# Patient Record
Sex: MALE | Race: WHITE | NOT HISPANIC OR LATINO | Employment: UNEMPLOYED | URBAN - METROPOLITAN AREA
[De-identification: names, ages, dates, MRNs, and addresses within clinical notes are randomized per-mention and may not be internally consistent; named-entity substitution may affect disease eponyms.]

---

## 2018-10-09 ENCOUNTER — TELEPHONE (OUTPATIENT)
Dept: MEDICAL GROUP | Facility: PHYSICIAN GROUP | Age: 53
End: 2018-10-09

## 2018-10-09 NOTE — TELEPHONE ENCOUNTER
Future Appointments       Provider Department Center    10/10/2018 8:35 AM Deandra Trotter M.D. [facility]        NEW PATIENT VISIT PRE-VISIT PLANNING    1.  [employer] Patient is checked in Patient Demographics? YES    2.  Immunizations were updated in [facility] using WebIZ?: Yes       •  Web Iz Recommendations: FLU, MMR , TDAP, VARICELLA (Chicken Pox)  and ZOSTAVAX (Shingles)    3.  Is this appointment scheduled as a Hospital Follow-Up? No    4.  Patient is due for the following Health Maintenance Topics:   Health Maintenance Due   Topic Date Due   • IMM DTaP/Tdap/Td Vaccine (1 - Tdap) 07/02/1984   • COLONOSCOPY  07/02/2015   • IMM ZOSTER VACCINES (1 of 2) 07/02/2015   • IMM INFLUENZA (1) 09/01/2018       5.  Reviewed/Updated the following with patient:   •   Preferred Pharmacy? NO       •   Preferred Lab? NO       •   Preferred Communication? NO       •   Allergies? NO       •   Medications? NO       •   Social History? NO       •   Family History (document living status of immediate family members and if + hx of cancer, diabetes, hypertension, hyperlipidemia, heart attack, stroke) NO    6.  Updated Care Team?       •   DME Company (gait device, O2, CPAP, etc.) NO       •   Other Specialists (eye doctor, derm, GYN, cardiology, endo, etc): NO    7.  MDX printed for Provider? NO    8.  Patient was informed to arrive 15 min prior to their   scheduled appointment and bring in their medication bottles. LVM  Was unable to get in contact with Pt. Prior to visit to complete PVP

## 2018-10-10 ENCOUNTER — OFFICE VISIT (OUTPATIENT)
Dept: MEDICAL GROUP | Facility: PHYSICIAN GROUP | Age: 53
End: 2018-10-10
Payer: COMMERCIAL

## 2018-10-10 ENCOUNTER — HOSPITAL ENCOUNTER (OUTPATIENT)
Dept: LAB | Facility: MEDICAL CENTER | Age: 53
End: 2018-10-10
Attending: FAMILY MEDICINE
Payer: COMMERCIAL

## 2018-10-10 VITALS
RESPIRATION RATE: 18 BRPM | WEIGHT: 170 LBS | SYSTOLIC BLOOD PRESSURE: 110 MMHG | BODY MASS INDEX: 24.34 KG/M2 | HEIGHT: 70 IN | TEMPERATURE: 97.6 F | HEART RATE: 76 BPM | DIASTOLIC BLOOD PRESSURE: 78 MMHG | OXYGEN SATURATION: 97 %

## 2018-10-10 DIAGNOSIS — Z13.6 SCREENING FOR CARDIOVASCULAR CONDITION: ICD-10-CM

## 2018-10-10 DIAGNOSIS — Z13.1 SCREENING FOR DIABETES MELLITUS: ICD-10-CM

## 2018-10-10 DIAGNOSIS — B18.1 CHRONIC VIRAL HEPATITIS B WITHOUT DELTA AGENT AND WITHOUT COMA (HCC): ICD-10-CM

## 2018-10-10 DIAGNOSIS — Z12.5 SCREENING FOR PROSTATE CANCER: ICD-10-CM

## 2018-10-10 DIAGNOSIS — Z11.3 SCREEN FOR STD (SEXUALLY TRANSMITTED DISEASE): ICD-10-CM

## 2018-10-10 DIAGNOSIS — Z12.12 SCREENING FOR COLORECTAL CANCER: ICD-10-CM

## 2018-10-10 DIAGNOSIS — R03.0 ELEVATED BLOOD PRESSURE READING: ICD-10-CM

## 2018-10-10 DIAGNOSIS — Z12.11 SCREENING FOR COLORECTAL CANCER: ICD-10-CM

## 2018-10-10 DIAGNOSIS — R11.0 NAUSEA: ICD-10-CM

## 2018-10-10 LAB
ALBUMIN SERPL BCP-MCNC: 4.5 G/DL (ref 3.2–4.9)
ALBUMIN/GLOB SERPL: 1.6 G/DL
ALP SERPL-CCNC: 71 U/L (ref 30–99)
ALT SERPL-CCNC: 15 U/L (ref 2–50)
ANION GAP SERPL CALC-SCNC: 10 MMOL/L (ref 0–11.9)
AST SERPL-CCNC: 23 U/L (ref 12–45)
BASOPHILS # BLD AUTO: 0.6 % (ref 0–1.8)
BASOPHILS # BLD: 0.03 K/UL (ref 0–0.12)
BILIRUB SERPL-MCNC: 0.9 MG/DL (ref 0.1–1.5)
BUN SERPL-MCNC: 14 MG/DL (ref 8–22)
CALCIUM SERPL-MCNC: 9.2 MG/DL (ref 8.5–10.5)
CHLORIDE SERPL-SCNC: 102 MMOL/L (ref 96–112)
CHOLEST SERPL-MCNC: 149 MG/DL (ref 100–199)
CO2 SERPL-SCNC: 23 MMOL/L (ref 20–33)
CREAT SERPL-MCNC: 0.69 MG/DL (ref 0.5–1.4)
EOSINOPHIL # BLD AUTO: 0.17 K/UL (ref 0–0.51)
EOSINOPHIL NFR BLD: 3.2 % (ref 0–6.9)
ERYTHROCYTE [DISTWIDTH] IN BLOOD BY AUTOMATED COUNT: 44.5 FL (ref 35.9–50)
EST. AVERAGE GLUCOSE BLD GHB EST-MCNC: 120 MG/DL
GLOBULIN SER CALC-MCNC: 2.9 G/DL (ref 1.9–3.5)
GLUCOSE SERPL-MCNC: 112 MG/DL (ref 65–99)
HBA1C MFR BLD: 5.8 % (ref 0–5.6)
HBV CORE AB SERPL QL IA: REACTIVE
HBV SURFACE AB SERPL IA-ACNC: 75.83 MIU/ML (ref 0–10)
HBV SURFACE AG SER QL: NEGATIVE
HCT VFR BLD AUTO: 50.1 % (ref 42–52)
HCV AB SER QL: REACTIVE
HDLC SERPL-MCNC: 58 MG/DL
HGB BLD-MCNC: 17.7 G/DL (ref 14–18)
HIV 1+2 AB+HIV1 P24 AG SERPL QL IA: NON REACTIVE
IMM GRANULOCYTES # BLD AUTO: 0.01 K/UL (ref 0–0.11)
IMM GRANULOCYTES NFR BLD AUTO: 0.2 % (ref 0–0.9)
LDLC SERPL CALC-MCNC: 79 MG/DL
LYMPHOCYTES # BLD AUTO: 1.3 K/UL (ref 1–4.8)
LYMPHOCYTES NFR BLD: 24.6 % (ref 22–41)
MCH RBC QN AUTO: 32.4 PG (ref 27–33)
MCHC RBC AUTO-ENTMCNC: 35.3 G/DL (ref 33.7–35.3)
MCV RBC AUTO: 91.8 FL (ref 81.4–97.8)
MONOCYTES # BLD AUTO: 0.62 K/UL (ref 0–0.85)
MONOCYTES NFR BLD AUTO: 11.7 % (ref 0–13.4)
NEUTROPHILS # BLD AUTO: 3.16 K/UL (ref 1.82–7.42)
NEUTROPHILS NFR BLD: 59.7 % (ref 44–72)
NRBC # BLD AUTO: 0 K/UL
NRBC BLD-RTO: 0 /100 WBC
PLATELET # BLD AUTO: 173 K/UL (ref 164–446)
PMV BLD AUTO: 10.7 FL (ref 9–12.9)
POTASSIUM SERPL-SCNC: 4 MMOL/L (ref 3.6–5.5)
PROT SERPL-MCNC: 7.4 G/DL (ref 6–8.2)
PSA SERPL-MCNC: 0.14 NG/ML (ref 0–4)
RBC # BLD AUTO: 5.46 M/UL (ref 4.7–6.1)
SODIUM SERPL-SCNC: 135 MMOL/L (ref 135–145)
TRIGL SERPL-MCNC: 61 MG/DL (ref 0–149)
WBC # BLD AUTO: 5.3 K/UL (ref 4.8–10.8)

## 2018-10-10 PROCEDURE — 86706 HEP B SURFACE ANTIBODY: CPT

## 2018-10-10 PROCEDURE — 83036 HEMOGLOBIN GLYCOSYLATED A1C: CPT

## 2018-10-10 PROCEDURE — 99204 OFFICE O/P NEW MOD 45 MIN: CPT | Performed by: FAMILY MEDICINE

## 2018-10-10 PROCEDURE — 84153 ASSAY OF PSA TOTAL: CPT

## 2018-10-10 PROCEDURE — 87522 HEPATITIS C REVRS TRNSCRPJ: CPT

## 2018-10-10 PROCEDURE — 80053 COMPREHEN METABOLIC PANEL: CPT

## 2018-10-10 PROCEDURE — 85025 COMPLETE CBC W/AUTO DIFF WBC: CPT

## 2018-10-10 PROCEDURE — 86704 HEP B CORE ANTIBODY TOTAL: CPT

## 2018-10-10 PROCEDURE — 87340 HEPATITIS B SURFACE AG IA: CPT

## 2018-10-10 PROCEDURE — 36415 COLL VENOUS BLD VENIPUNCTURE: CPT

## 2018-10-10 PROCEDURE — 86803 HEPATITIS C AB TEST: CPT

## 2018-10-10 PROCEDURE — 87389 HIV-1 AG W/HIV-1&-2 AB AG IA: CPT

## 2018-10-10 PROCEDURE — 80061 LIPID PANEL: CPT

## 2018-10-10 RX ORDER — OMEPRAZOLE 20 MG/1
CAPSULE, DELAYED RELEASE ORAL
Qty: 30 CAP | Refills: 0 | Status: SHIPPED | OUTPATIENT
Start: 2018-10-10 | End: 2018-11-30

## 2018-10-10 ASSESSMENT — PATIENT HEALTH QUESTIONNAIRE - PHQ9: CLINICAL INTERPRETATION OF PHQ2 SCORE: 0

## 2018-10-10 NOTE — PROGRESS NOTES
"cc: Hepatitis B      Subjective:     Misael Echevarria is a 53 y.o. male presenting for the following:     Hepatitis B-patient was a previous IV drug user and stopped using in 2003.  He was then seen by gastroenterology and treated for hepatitis B 5 years ago.  He was told that his numbers were low enough that he was considered cured and they told him to stop the Baraclude and to follow-up yearly for blood work.  But he then lost his insurance and has not been back since.  He would like a referral back to them now.  But he has been feeling well.  He has not had problems with yellow skin, abdominal pain, poor appetite, changes in weight.    Recent gastroenteritis-but 4 days ago patient with a day of vomiting and diarrhea.  He is not sure if he ate something or if it was a virus.  He did not have any blood in the vomit or stool.  The diarrhea and abdominal cramping has completely improved.  But he has noticed that his stomach is still not quite right.  He is having some nausea (no vomiting).  He is only able to tolerate small amounts of bland foods.  He is not having any fevers or chills or abdominal pain.    He also is using a nicotine patch and doing his best to quit smoking.  He now feels ready to move down to the lower dose patch.  He is very motivated for this.    Review of systems:  All others reviewed and are negative.       Current Outpatient Prescriptions:   •  omeprazole (PRILOSEC) 20 MG delayed-release capsule, One tablet daily for 2 weeks, then every other day for 1-3 weeks, then stop., Disp: 30 Cap, Rfl: 0    Allergies, past medical history, past surgical history, family history, social history reviewed and updated    Objective:     Vitals: /78 (BP Location: Right arm, Patient Position: Sitting, BP Cuff Size: Adult)   Pulse 76   Temp 36.4 °C (97.6 °F) (Temporal)   Resp 18   Ht 1.778 m (5' 10\")   Wt 77.1 kg (170 lb)   SpO2 97%   BMI 24.39 kg/m²   General: Alert, pleasant, NAD  HEENT: Normocephalic.   " EOMI, no icterus or pallor.  Conjunctivae and lids normal. External ears normal. Oropharynx non-erythematous, mucous membranes moist.    Neck supple.  No thyromegaly or masses palpated. No cervical or supraclavicular lymphadenopathy.  Heart: Regular rate and rhythm.  S1 and S2 normal.  No murmurs appreciated.  Respiratory: Normal respiratory effort.  Clear to auscultation bilaterally.  Abdomen: Non-distended, soft  Skin: Warm, dry, no rashes.  Musculoskeletal: Gait is normal.  Moves all extremities well.  Extremities: No leg edema.    Neurological: No tremors, sensation grossly intact,  tone/strength normal, gait is normal, CN2-12 grossly intact  Psych:  Affect is normal, judgement is good, memory is intact, grooming is appropriate.    Assessment/Plan:     Misael was seen today for annual exam.    Diagnoses and all orders for this visit:    Chronic viral hepatitis B without delta agent and without coma (HCC): Previously treated 5 years ago and then lost to follow-up due to insurance issues.  Will refer back to GI consultants and get screening blood work.  Patient instructed to get copies of blood work prior to his appointment with gastroenterology.  -     COMP METABOLIC PANEL; Future  -     HEP B SURFACE AB; Future  -     HEP B SURFACE ANTIGEN; Future  -     HEP B CORE AB TOTAL; Future  -     HEP C VIRUS ANTIBODY; Future  -     HIV AG/AB COMBO ASSAY SCREENING; Future  -     CBC WITH DIFFERENTIAL; Future  -     REFERRAL TO GASTROENTEROLOGY    Screening for colorectal cancer patient would also like a colonoscopy to screen for colon cancer when he sees gastroenterology.  He does not want to do the annual fit testing.  -     REFERRAL TO GASTROENTEROLOGY    Screening for prostate cancer-patient does have a hAlf brother with prostate cancer. Patient counseled on the risks and benefits of PSA testing.  Patient verbalizes understanding of risk of false positive, false negative, and need for referral and possibly prostate  biopsy if positive.  Patient without lower urinary tract symptoms.    -     PROSTATE SPECIFIC AG SCREENING; Future    Screening for diabetes mellitus  -     HEMOGLOBIN A1C; Future    Screening for cardiovascular condition  -     LIPID PROFILE; Future    Screen for STD (sexually transmitted disease)  -     HEP B SURFACE AB; Future  -     HEP B SURFACE ANTIGEN; Future  -     HEP B CORE AB TOTAL; Future  -     HEP C VIRUS ANTIBODY; Future  -     HIV AG/AB COMBO ASSAY SCREENING; Future    Nausea-patient with recent GI illness and with some continued nausea.  Likely some gastritis.  Will treat with a few weeks of tapering Prilosec.  Return to clinic if not improved.  -     omeprazole (PRILOSEC) 20 MG delayed-release capsule; One tablet daily for 2 weeks, then every other day for 1-3 weeks, then stop.    Elevated blood pressure reading-patient had an elevated systolic blood pressure to 180 in the past at work.  Blood pressure good today and on our last check.  Will monitor blood pressure.      Return in about 3 months (around 1/10/2019).

## 2018-10-13 LAB
HCV QNT BY NAAT INTERP L112599: NOT DETECTED
HCV RNA SERPL NAA+PROBE-ACNC: NOT DETECTED IU/ML
HCV RNA SERPL NAA+PROBE-LOG IU: NOT DETECTED LOG IU/ML

## 2018-11-01 ENCOUNTER — HOSPITAL ENCOUNTER (OUTPATIENT)
Dept: RADIOLOGY | Facility: MEDICAL CENTER | Age: 53
End: 2018-11-01
Attending: PHYSICIAN ASSISTANT
Payer: COMMERCIAL

## 2018-11-09 ENCOUNTER — HOSPITAL ENCOUNTER (OUTPATIENT)
Dept: RADIOLOGY | Facility: MEDICAL CENTER | Age: 53
End: 2018-11-09
Attending: PHYSICIAN ASSISTANT
Payer: COMMERCIAL

## 2018-11-09 DIAGNOSIS — B18.1 HEPATITIS B CARRIER (HCC): ICD-10-CM

## 2018-11-09 DIAGNOSIS — Z12.11 SPECIAL SCREENING FOR MALIGNANT NEOPLASMS, COLON: ICD-10-CM

## 2018-11-09 PROCEDURE — 76705 ECHO EXAM OF ABDOMEN: CPT

## 2019-02-07 ENCOUNTER — OFFICE VISIT (OUTPATIENT)
Dept: MEDICAL GROUP | Facility: PHYSICIAN GROUP | Age: 54
End: 2019-02-07
Payer: COMMERCIAL

## 2019-02-07 VITALS
HEART RATE: 68 BPM | OXYGEN SATURATION: 94 % | HEIGHT: 70 IN | DIASTOLIC BLOOD PRESSURE: 74 MMHG | BODY MASS INDEX: 24.34 KG/M2 | WEIGHT: 170 LBS | RESPIRATION RATE: 18 BRPM | SYSTOLIC BLOOD PRESSURE: 118 MMHG | TEMPERATURE: 97 F

## 2019-02-07 DIAGNOSIS — Z71.6 ENCOUNTER FOR SMOKING CESSATION COUNSELING: ICD-10-CM

## 2019-02-07 PROCEDURE — 99214 OFFICE O/P EST MOD 30 MIN: CPT | Performed by: FAMILY MEDICINE

## 2019-02-07 RX ORDER — VARENICLINE TARTRATE 1 MG/1
1 TABLET, FILM COATED ORAL 2 TIMES DAILY
Qty: 60 TAB | Refills: 3 | Status: SHIPPED | OUTPATIENT
Start: 2019-02-07 | End: 2019-07-11

## 2019-02-07 ASSESSMENT — PATIENT HEALTH QUESTIONNAIRE - PHQ9: CLINICAL INTERPRETATION OF PHQ2 SCORE: 0

## 2019-02-07 NOTE — PATIENT INSTRUCTIONS
Chantix/varenicline:   Initial:  Days 1 to 3: 0.5 mg once daily  Days 4 to 7: 0.5 mg twice daily  Maintenance (? Day 8): 1 mg twice daily for 11 weeks; may consider a temporary or permanent dose reduction if usual dose is not tolerated.    Note: Start 1 week before target quit date.  If able to successfully quit smoking at the end of the 12 weeks, may continue for another 12 weeks to help maintain success.

## 2019-02-07 NOTE — PROGRESS NOTES
"cc: smoking cessation counseling      Subjective:     Misael Echevarria is a 53 y.o. male presenting for the following:     [place] has been smoking since he was a young man.  Averaged 1 PPD for about 30 years. He has been cutting back with nicotine patches but is unable to get lower than 8 cigs per day. He has never tried Chantix but he is very interested in this medication.     Never with h/o seizure. No renal dysfunction. Denies problems with mood.     Review of systems:  All others reviewed and are negative.       Current Outpatient Prescriptions:   •  varenicline (CHANTIX STARTING MONTH SUKH) 0.5 MG X 11 & 1 MG X 42 tablet, Days 1-3: 0.5 mg once daily. Days 4-7: 0.5 mg twice daily. Then 1 mg twice daily., Disp: 56 Tab, Rfl: 0  •  varenicline (CHANTIX CONTINUING MONTH SUKH) 1 MG tablet, Take 1 Tab by mouth 2 times a day., Disp: 60 Tab, Rfl: 3    Allergies, past medical history, past surgical history, family history, social history reviewed and updated    Objective:     Vitals: /74 (BP Location: Left arm, Patient Position: Sitting, BP Cuff Size: Adult)   Pulse 68   Temp 36.1 °C (97 °F) (Temporal)   Resp 18   Ht 1.778 m (5' 10\")   Wt 77.1 kg (170 lb)   SpO2 94%   BMI 24.39 kg/m²   General: Alert, pleasant, NAD  Heart: Regular rate and rhythm.  S1 and S2 normal.  No murmurs appreciated.  Respiratory: Normal respiratory effort.  Clear to auscultation bilaterally.  Psych:  Affect is normal, judgement is good, memory is intact, grooming is appropriate.    Assessment/Plan:     Misael was seen today for medication refill.    Diagnoses and all orders for this visit:    Encounter for smoking cessation counseling:patient very interested in Chantix, as he has failed nicotine replacement with patches. No contraindications. Common side effects discussed.   -Did discuss lung cancer screening program when he reaches age 55, as he does have 30 pack year history.   -     varenicline (CHANTIX STARTING MONTH SUKH) 0.5 MG X 11 " & 1 MG X 42 tablet; Days 1-3: 0.5 mg once daily. Days 4-7: 0.5 mg twice daily. Then 1 mg twice daily.  -     varenicline (CHANTIX CONTINUING MONTH SUKH) 1 MG tablet; Take 1 Tab by mouth 2 times a day.    Return if symptoms worsen or fail to improve.

## 2019-05-17 ENCOUNTER — HOSPITAL ENCOUNTER (OUTPATIENT)
Dept: LAB | Facility: MEDICAL CENTER | Age: 54
End: 2019-05-17
Attending: PHYSICIAN ASSISTANT
Payer: COMMERCIAL

## 2019-05-17 LAB
ALBUMIN SERPL BCP-MCNC: 4.4 G/DL (ref 3.2–4.9)
ALP SERPL-CCNC: 64 U/L (ref 30–99)
ALT SERPL-CCNC: 17 U/L (ref 2–50)
AST SERPL-CCNC: 19 U/L (ref 12–45)
BILIRUB CONJ SERPL-MCNC: 0.2 MG/DL (ref 0.1–0.5)
BILIRUB INDIRECT SERPL-MCNC: 0.5 MG/DL (ref 0–1)
BILIRUB SERPL-MCNC: 0.7 MG/DL (ref 0.1–1.5)
PROT SERPL-MCNC: 7.3 G/DL (ref 6–8.2)

## 2019-05-17 PROCEDURE — 87517 HEPATITIS B DNA QUANT: CPT

## 2019-05-17 PROCEDURE — 36415 COLL VENOUS BLD VENIPUNCTURE: CPT

## 2019-05-17 PROCEDURE — 80076 HEPATIC FUNCTION PANEL: CPT

## 2019-05-19 LAB
HBV DNA SERPL NAA+PROBE-ACNC: ABNORMAL IU/ML
HBV DNA SERPL NAA+PROBE-LOG IU: <1 LOG IU/ML
HBV DNA SERPL QL NAA+PROBE: DETECTED

## 2019-05-31 ENCOUNTER — HOSPITAL ENCOUNTER (OUTPATIENT)
Dept: RADIOLOGY | Facility: MEDICAL CENTER | Age: 54
End: 2019-05-31
Attending: PHYSICIAN ASSISTANT
Payer: COMMERCIAL

## 2019-05-31 DIAGNOSIS — B18.1 HEPATITIS B CARRIER (HCC): ICD-10-CM

## 2019-05-31 PROCEDURE — 76705 ECHO EXAM OF ABDOMEN: CPT

## 2019-07-11 ENCOUNTER — OFFICE VISIT (OUTPATIENT)
Dept: MEDICAL GROUP | Facility: PHYSICIAN GROUP | Age: 54
End: 2019-07-11
Payer: COMMERCIAL

## 2019-07-11 ENCOUNTER — TELEPHONE (OUTPATIENT)
Dept: MEDICAL GROUP | Facility: PHYSICIAN GROUP | Age: 54
End: 2019-07-11

## 2019-07-11 VITALS
OXYGEN SATURATION: 96 % | DIASTOLIC BLOOD PRESSURE: 70 MMHG | BODY MASS INDEX: 23.48 KG/M2 | HEIGHT: 70 IN | HEART RATE: 60 BPM | SYSTOLIC BLOOD PRESSURE: 122 MMHG | WEIGHT: 164 LBS | TEMPERATURE: 98.7 F

## 2019-07-11 DIAGNOSIS — F17.210 CIGARETTE NICOTINE DEPENDENCE WITHOUT COMPLICATION: ICD-10-CM

## 2019-07-11 DIAGNOSIS — N52.9 ERECTILE DYSFUNCTION, UNSPECIFIED ERECTILE DYSFUNCTION TYPE: ICD-10-CM

## 2019-07-11 PROCEDURE — 99213 OFFICE O/P EST LOW 20 MIN: CPT | Mod: 25 | Performed by: PHYSICIAN ASSISTANT

## 2019-07-11 PROCEDURE — 99406 BEHAV CHNG SMOKING 3-10 MIN: CPT | Performed by: PHYSICIAN ASSISTANT

## 2019-07-11 RX ORDER — SILDENAFIL 50 MG/1
50 TABLET, FILM COATED ORAL PRN
Qty: 10 TAB | Refills: 3 | Status: SHIPPED | OUTPATIENT
Start: 2019-07-11 | End: 2021-02-10

## 2019-07-11 RX ORDER — NICOTINE 21 MG/24HR
1 PATCH, TRANSDERMAL 24 HOURS TRANSDERMAL EVERY 24 HOURS
Qty: 30 PATCH | Refills: 1 | Status: SHIPPED | OUTPATIENT
Start: 2019-07-11 | End: 2019-11-19

## 2019-07-12 NOTE — PROGRESS NOTES
"Subjective:   Misael Echevarria is a 54 y.o. male here today for smoking cessation. Is an established patient of Deandra Trotter MD.    HPI:    Misael presents to the office today to discuss smoking cessation. He has 30 pack year history. He was seen by PCP back in February who prescribed Chantix. He states that he had difficulty taking this correctly due to needing to take with food. Also didn't like how it made him feel. Is requesting to go back on nicotine patches which he was using prior to Chantix. States did not experience any side effects and was able to get down to about 6 cigarettes per day.    He also has a complaint regarding difficulty obtaining/maintaining erection.  He states that he previously discussed this issue with his PCP who thought that his problem was likely psychogenic in nature.  He does feel that this could be the case.  He recently has started becoming sexually active with women again after not being sexually active for many years.  He does feel that he has some performance anxiety and is very embarrassed by not being able to perform sexually. Cannot maintain erection. He knows he is still capable of strong erection because he has one every morning. He recently purchased what he states is Viagra after seeing a commercial on the television.  He states it looked like Viagra but did not work as well as previous Viagra that he is tried so is not sure that it was really what it said it was.  He is requesting new prescription for Viagra, as he feels that it will help him to regain his confidence.      Current medicines (including changes today)  No current outpatient prescriptions on file.     No current facility-administered medications for this visit.      He  has a past medical history of Drug abuse (HCC).    ROS  As per HPI.       Objective:     /70 (BP Location: Left arm, Patient Position: Sitting, BP Cuff Size: Adult)   Pulse 60   Temp 37.1 °C (98.7 °F)   Ht 1.778 m (5' 10\")   Wt 74.4 kg " (164 lb)   SpO2 96%  Body mass index is 23.53 kg/m².     Physical Exam:  Constitutional: Alert, well-appearing, very pleasant, no distress. Smells of tobacco smoke.  Skin: No rashes in visible areas.  Eye: Conjunctiva clear, lids normal.  ENMT: Lips without lesions, moist mucus membranes.      Assessment and Plan:   The following treatment plan was discussed    1. Cigarette nicotine dependence without complication  Established problem, uncontrolled.  Does not want to continue Chantix.  I have prescribed nicotine patches which he previously did reasonably well on. Will start at 21 mg/day.  Advised to contact the clinic when he has about a week left of the patches and the next step down, 14 mg can be prescribed. He does feel that this time, he will be able to fully quit.  Strongly encouraged him to consider support group/quit line. Total counseling time: 5 minutes  - nicotine (NICODERM) 21 MG/24HR PATCH 24 HR; Apply 1 Patch to skin as directed every 24 hours.  Dispense: 30 Patch; Refill: 1    2. Erectile dysfunction, unspecified erectile dysfunction type  New problem to me, uncontrolled.  Agree that his ED seems psychogenic in nature.  A trial of Viagra may be helpful to help with performance anxiety.  I have prescribed.  I highly doubt that the medication he received from the television was true Viagra.  - sildenafil citrate (VIAGRA) 50 MG tablet; Take 1 Tab by mouth as needed for Erectile Dysfunction.  Dispense: 10 Tab; Refill: 3      Followup: Return if symptoms worsen or fail to improve.    Hermila Hope P.A.-C.

## 2019-07-12 NOTE — PATIENT INSTRUCTIONS
"Smoking Cessation, Tips for Success  If you are ready to quit smoking, congratulations! You have chosen to help yourself be healthier. Cigarettes bring nicotine, tar, carbon monoxide, and other irritants into your body. Your lungs, heart, and blood vessels will be able to work better without these poisons. There are many different ways to quit smoking. Nicotine gum, nicotine patches, a nicotine inhaler, or nicotine nasal spray can help with physical craving. Hypnosis, support groups, and medicines help break the habit of smoking.  WHAT THINGS CAN I DO TO MAKE QUITTING EASIER?   Here are some tips to help you quit for good:  · Pick a date when you will quit smoking completely. Tell all of your friends and family about your plan to quit on that date.  · Do not try to slowly cut down on the number of cigarettes you are smoking. Pick a quit date and quit smoking completely starting on that day.  · Throw away all cigarettes.    · Clean and remove all ashtrays from your home, work, and car.  · On a card, write down your reasons for quitting. Carry the card with you and read it when you get the urge to smoke.  · Cleanse your body of nicotine. Drink enough water and fluids to keep your urine clear or pale yellow. Do this after quitting to flush the nicotine from your body.  · Learn to predict your moods. Do not let a bad situation be your excuse to have a cigarette. Some situations in your life might tempt you into wanting a cigarette.  · Never have \"just one\" cigarette. It leads to wanting another and another. Remind yourself of your decision to quit.  · Change habits associated with smoking. If you smoked while driving or when feeling stressed, try other activities to replace smoking. Stand up when drinking your coffee. Brush your teeth after eating. Sit in a different chair when you read the paper. Avoid alcohol while trying to quit, and try to drink fewer caffeinated beverages. Alcohol and caffeine may urge you to " "smoke.  · Avoid foods and drinks that can trigger a desire to smoke, such as sugary or spicy foods and alcohol.  · Ask people who smoke not to smoke around you.  · Have something planned to do right after eating or having a cup of coffee. For example, plan to take a walk or exercise.  · Try a relaxation exercise to calm you down and decrease your stress. Remember, you may be tense and nervous for the first 2 weeks after you quit, but this will pass.  · Find new activities to keep your hands busy. Play with a pen, coin, or rubber band. Doodle or draw things on paper.  · Brush your teeth right after eating. This will help cut down on the craving for the taste of tobacco after meals. You can also try mouthwash.    · Use oral substitutes in place of cigarettes. Try using lemon drops, carrots, cinnamon sticks, or chewing gum. Keep them handy so they are available when you have the urge to smoke.  · When you have the urge to smoke, try deep breathing.  · Designate your home as a nonsmoking area.  · If you are a heavy smoker, ask your health care provider about a prescription for nicotine chewing gum. It can ease your withdrawal from nicotine.  · Reward yourself. Set aside the cigarette money you save and buy yourself something nice.  · Look for support from others. Join a support group or smoking cessation program. Ask someone at home or at work to help you with your plan to quit smoking.  · Always ask yourself, \"Do I need this cigarette or is this just a reflex?\" Tell yourself, \"Today, I choose not to smoke,\" or \"I do not want to smoke.\" You are reminding yourself of your decision to quit.  · Do not replace cigarette smoking with electronic cigarettes (commonly called e-cigarettes). The safety of e-cigarettes is unknown, and some may contain harmful chemicals.  · If you relapse, do not give up! Plan ahead and think about what you will do the next time you get the urge to smoke.  HOW WILL I FEEL WHEN I QUIT SMOKING?  You " may have symptoms of withdrawal because your body is used to nicotine (the addictive substance in cigarettes). You may crave cigarettes, be irritable, feel very hungry, cough often, get headaches, or have difficulty concentrating. The withdrawal symptoms are only temporary. They are strongest when you first quit but will go away within 10-14 days. When withdrawal symptoms occur, stay in control. Think about your reasons for quitting. Remind yourself that these are signs that your body is healing and getting used to being without cigarettes. Remember that withdrawal symptoms are easier to treat than the major diseases that smoking can cause.   Even after the withdrawal is over, expect periodic urges to smoke. However, these cravings are generally short lived and will go away whether you smoke or not. Do not smoke!  WHAT RESOURCES ARE AVAILABLE TO HELP ME QUIT SMOKING?  Your health care provider can direct you to community resources or hospitals for support, which may include:  · Group support.  · Education.  · Hypnosis.  · Therapy.     This information is not intended to replace advice given to you by your health care provider. Make sure you discuss any questions you have with your health care provider.     Document Released: 09/15/2005 Document Revised: 01/08/2016 Document Reviewed: 06/05/2014  [employer] Interactive Patient Education ©2016 [employer]

## 2019-07-12 NOTE — TELEPHONE ENCOUNTER
[facility] pharmacy called requesting clarification on sildenafil. They are needing max dose per day. Please advise.

## 2019-09-13 ENCOUNTER — HOSPITAL ENCOUNTER (OUTPATIENT)
Dept: LAB | Facility: MEDICAL CENTER | Age: 54
End: 2019-09-13
Attending: PHYSICIAN ASSISTANT
Payer: COMMERCIAL

## 2019-09-13 LAB
ALBUMIN SERPL BCP-MCNC: 4.5 G/DL (ref 3.2–4.9)
ALP SERPL-CCNC: 66 U/L (ref 30–99)
ALT SERPL-CCNC: 14 U/L (ref 2–50)
AST SERPL-CCNC: 18 U/L (ref 12–45)
BILIRUB CONJ SERPL-MCNC: 0.1 MG/DL (ref 0.1–0.5)
BILIRUB INDIRECT SERPL-MCNC: 0.5 MG/DL (ref 0–1)
BILIRUB SERPL-MCNC: 0.6 MG/DL (ref 0.1–1.5)
HBV SURFACE AG SER QL: NEGATIVE
PROT SERPL-MCNC: 7.2 G/DL (ref 6–8.2)

## 2019-09-13 PROCEDURE — 80076 HEPATIC FUNCTION PANEL: CPT

## 2019-09-13 PROCEDURE — 87517 HEPATITIS B DNA QUANT: CPT

## 2019-09-13 PROCEDURE — 36415 COLL VENOUS BLD VENIPUNCTURE: CPT

## 2019-09-13 PROCEDURE — 87340 HEPATITIS B SURFACE AG IA: CPT

## 2019-09-28 DIAGNOSIS — F17.210 CIGARETTE NICOTINE DEPENDENCE WITHOUT COMPLICATION: ICD-10-CM

## 2019-09-30 NOTE — TELEPHONE ENCOUNTER
Was the patient seen in the last year in this department? Yes    Does patient have an active prescription for medications requested? No     Received Request Via: Pharmacy      Pt met protocol?: Yes   Pt last ov 7/19

## 2019-09-30 NOTE — TELEPHONE ENCOUNTER
Dr Trotter- Pt has now taken 21mg for the last 2 month, not sure if you would like to decrease to 14mg or discuss further with pt.

## 2019-10-01 DIAGNOSIS — F17.210 CIGARETTE NICOTINE DEPENDENCE WITHOUT COMPLICATION: ICD-10-CM

## 2019-10-01 RX ORDER — NICOTINE 21 MG/24HR
1 PATCH, TRANSDERMAL 24 HOURS TRANSDERMAL EVERY 24 HOURS
Qty: 30 PATCH | Refills: 0 | OUTPATIENT
Start: 2019-10-01

## 2019-10-01 RX ORDER — NICOTINE 21 MG/24HR
1 PATCH, TRANSDERMAL 24 HOURS TRANSDERMAL EVERY 24 HOURS
Qty: 30 PATCH | Refills: 0 | Status: SHIPPED | OUTPATIENT
Start: 2019-10-01 | End: 2019-11-19 | Stop reason: SDUPTHER

## 2019-10-01 NOTE — TELEPHONE ENCOUNTER
Was the patient seen in the last year in this department? Yes    Does patient have an active prescription for medications requested? No     Received Request Via: Pharmacy      Pt met protocol?: Yes, ov 7/19

## 2019-10-01 NOTE — TELEPHONE ENCOUNTER
Dr. Trotter, Do you want patient to continue on this dose of Nicotine? Please refill as you see fit.

## 2019-11-18 DIAGNOSIS — F17.210 CIGARETTE NICOTINE DEPENDENCE WITHOUT COMPLICATION: ICD-10-CM

## 2019-11-19 RX ORDER — NICOTINE 21 MG/24HR
1 PATCH, TRANSDERMAL 24 HOURS TRANSDERMAL EVERY 24 HOURS
Qty: 30 PATCH | Refills: 0 | Status: SHIPPED | OUTPATIENT
Start: 2019-11-19 | End: 2021-02-10

## 2019-11-19 RX ORDER — NICOTINE 21 MG/24HR
1 PATCH, TRANSDERMAL 24 HOURS TRANSDERMAL EVERY 24 HOURS
Qty: 30 PATCH | Refills: 1 | OUTPATIENT
Start: 2019-11-19

## 2019-11-20 NOTE — TELEPHONE ENCOUNTER
Requesting refills of nicotine patches.  We will send 1 refill of 14 mg patch and a new prescription for the lower dose 7 mg patch.

## 2020-01-21 ENCOUNTER — OFFICE VISIT (OUTPATIENT)
Dept: URGENT CARE | Facility: PHYSICIAN GROUP | Age: 55
End: 2020-01-21
Payer: COMMERCIAL

## 2020-01-21 VITALS
HEART RATE: 84 BPM | RESPIRATION RATE: 16 BRPM | OXYGEN SATURATION: 96 % | BODY MASS INDEX: 22.19 KG/M2 | TEMPERATURE: 98.6 F | SYSTOLIC BLOOD PRESSURE: 118 MMHG | DIASTOLIC BLOOD PRESSURE: 76 MMHG | HEIGHT: 70 IN | WEIGHT: 155 LBS

## 2020-01-21 DIAGNOSIS — K52.9 GASTROENTERITIS: Primary | ICD-10-CM

## 2020-01-21 DIAGNOSIS — R11.2 NAUSEA AND VOMITING, INTRACTABILITY OF VOMITING NOT SPECIFIED, UNSPECIFIED VOMITING TYPE: ICD-10-CM

## 2020-01-21 DIAGNOSIS — R19.7 DIARRHEA, UNSPECIFIED TYPE: ICD-10-CM

## 2020-01-21 PROCEDURE — 99214 OFFICE O/P EST MOD 30 MIN: CPT | Performed by: NURSE PRACTITIONER

## 2020-01-21 RX ORDER — ONDANSETRON 4 MG/1
4 TABLET, ORALLY DISINTEGRATING ORAL EVERY 8 HOURS PRN
Qty: 10 TAB | Refills: 0 | Status: SHIPPED | OUTPATIENT
Start: 2020-01-21 | End: 2021-02-10

## 2020-01-21 RX ORDER — DIPHENOXYLATE HYDROCHLORIDE AND ATROPINE SULFATE 2.5; .025 MG/1; MG/1
1 TABLET ORAL 3 TIMES DAILY PRN
Qty: 20 TAB | Refills: 0 | Status: SHIPPED | OUTPATIENT
Start: 2020-01-21 | End: 2020-01-28

## 2020-01-21 ASSESSMENT — ENCOUNTER SYMPTOMS
ABDOMINAL PAIN: 0
DIARRHEA: 1
CHILLS: 0
FEVER: 0
VOMITING: 1
NAUSEA: 0
HEADACHES: 0

## 2020-01-21 ASSESSMENT — LIFESTYLE VARIABLES: SUBSTANCE_ABUSE: 0

## 2020-01-21 NOTE — PATIENT INSTRUCTIONS
Viral Gastroenteritis, Adult  Viral gastroenteritis is also known as the stomach flu. This condition is caused by various viruses. These viruses can be passed from person to person very easily (are very contagious). This condition may affect your stomach, small intestine, and large intestine. It can cause sudden watery diarrhea, fever, and vomiting.  Diarrhea and vomiting can make you feel weak and cause you to become dehydrated. You may not be able to keep fluids down. Dehydration can make you tired and thirsty, cause you to have a dry mouth, and decrease how often you urinate. Older adults and people with other diseases or a weak immune system are at higher risk for dehydration.  It is important to replace the fluids that you lose from diarrhea and vomiting. If you become severely dehydrated, you may need to get fluids through an IV tube.  What are the causes?  Gastroenteritis is caused by various viruses, including rotavirus and norovirus. Norovirus is the most common cause in adults.  You can get sick by eating food, drinking water, or touching a surface contaminated with one of these viruses. You can also get sick from sharing utensils or other personal items with an infected person.  What increases the risk?  This condition is more likely to develop in people:  · Who have a weak defense system (immune system).  · Who live with one or more children who are younger than 2 years old.  · Who live in a nursing home.  · Who go on cruise ships.  What are the signs or symptoms?  Symptoms of this condition start suddenly 1-2 days after exposure to a virus. Symptoms may last a few days or as long as a week. The most common symptoms are watery diarrhea and vomiting. Other symptoms include:  · Fever.  · Headache.  · Fatigue.  · Pain in the abdomen.  · Chills.  · Weakness.  · Nausea.  · Muscle aches.  · Loss of appetite.  How is this diagnosed?  This condition is diagnosed with a medical history and physical exam. You may  also have a stool test to check for viruses or other infections.  How is this treated?  This condition typically goes away on its own. The focus of treatment is to restore lost fluids (rehydration). Your health care provider may recommend that you take an oral rehydration solution (ORS) to replace important salts and minerals (electrolytes) in your body. Severe cases of this condition may require giving fluids through an IV tube.  Treatment may also include medicine to help with your symptoms.  Follow these instructions at home:  Follow instructions from your health care provider about how to care for yourself at home.  Eating and drinking  Follow these recommendations as told by your health care provider:  · Take an ORS. This is a drink that is sold at pharmacies and retail stores.  · Drink clear fluids in small amounts as you are able. Clear fluids include water, ice chips, diluted fruit juice, and low-calorie sports drinks.  · Eat bland, easy-to-digest foods in small amounts as you are able. These foods include bananas, applesauce, rice, lean meats, toast, and crackers.  · Avoid fluids that contain a lot of sugar or caffeine, such as energy drinks, sports drinks, and soda.  · Avoid alcohol.  · Avoid spicy or fatty foods.  General instructions  · Drink enough fluid to keep your urine clear or pale yellow.  · Wash your hands often. If soap and water are not available, use hand .  · Make sure that all people in your household wash their hands well and often.  · Take over-the-counter and prescription medicines only as told by your health care provider.  · Rest at home while you recover.  · Watch your condition for any changes.  · Take a warm bath to relieve any burning or pain from frequent diarrhea episodes.  · Keep all follow-up visits as told by your health care provider. This is important.  Contact a health care provider if:  · You cannot keep fluids down.  · Your symptoms get worse.  · You have new  symptoms.  · You feel light-headed or dizzy.  · You have muscle cramps.  Get help right away if:  · You have chest pain.  · You feel extremely weak or you faint.  · You see blood in your vomit.  · Your vomit looks like coffee grounds.  · You have bloody or black stools or stools that look like tar.  · You have a severe headache, a stiff neck, or both.  · You have a rash.  · You have severe pain, cramping, or bloating in your abdomen.  · You have trouble breathing or you are breathing very quickly.  · Your heart is beating very quickly.  · Your skin feels cold and clammy.  · You feel confused.  · You have pain when you urinate.  · You have signs of dehydration, such as:  ¨ Dark urine, very little urine, or no urine.  ¨ Cracked lips.  ¨ Dry mouth.  ¨ Sunken eyes.  ¨ Sleepiness.  ¨ Weakness.  This information is not intended to replace advice given to you by your health care provider. Make sure you discuss any questions you have with your health care provider.  Document Released: 12/18/2006 Document Revised: 05/31/2017 Document Reviewed: 08/23/2016  [employer] Patient Education © 2017 [employer]

## 2020-01-21 NOTE — PROGRESS NOTES
"Subjective:      Misael Echevarria is a 54 y.o. male who presents with Vomiting (diarrhea, cant keep anything down X saturday )    Reviewed past medical, surgical and family history. Reviewed prescription and OTC medications with patient in electronic health record today      No Known Allergies          HPI This is a new problem.  C/o vomiting and diarrhea x 3 days. Having diarrhea > 10 times/ day for the first few days. Now 5-10 times, watery.   Vomiting 3 times a day for the past few days. Last time 10:00 am today.  Feeling nauseated all the time.  Symptoms are slowly improving but waxing and waning. No recent antibiotics, hospitalizations, illness, travel. Treatments tried: peptobismol, rest, fluids - electrolyte. No other aggravating or alleviating factors.       Review of Systems   Constitutional: Negative for chills and fever.   Gastrointestinal: Positive for diarrhea and vomiting. Negative for abdominal pain and nausea.   Neurological: Negative for headaches.   Endo/Heme/Allergies: Negative for environmental allergies.   Psychiatric/Behavioral: Negative for substance abuse.          Objective:     /76   Pulse 84   Temp 37 °C (98.6 °F)   Resp 16   Ht 1.778 m (5' 10\")   Wt 70.3 kg (155 lb)   SpO2 96%   BMI 22.24 kg/m²      Physical Exam  Vitals signs and nursing note reviewed.   Constitutional:       General: He is not in acute distress.     Appearance: Normal appearance. He is well-developed. He is not toxic-appearing or diaphoretic.   HENT:      Head: Normocephalic.      Mouth/Throat:      Mouth: Mucous membranes are moist.   Cardiovascular:      Rate and Rhythm: Normal rate and regular rhythm.   Pulmonary:      Effort: Pulmonary effort is normal.      Breath sounds: Normal breath sounds.   Abdominal:      General: Bowel sounds are normal.      Palpations: Abdomen is soft.      Tenderness: There is generalized tenderness. There is no right CVA tenderness, left CVA tenderness, guarding or rebound. "   Musculoskeletal: Normal range of motion.   Skin:     Capillary Refill: Capillary refill takes less than 2 seconds.   Neurological:      Mental Status: He is alert and oriented to person, place, and time.      Deep Tendon Reflexes: Reflexes are normal and symmetric.   Psychiatric:         Mood and Affect: Mood normal.         Behavior: Behavior is cooperative.         Thought Content: Thought content normal.                 Assessment/Plan:     1. Gastroenteritis     2. Diarrhea, unspecified type  diphenoxylate-atropine (LOMOTIL) 2.5-0.025 MG Tab   3. Nausea and vomiting, intractability of vomiting not specified, unspecified vomiting type  ondansetron (ZOFRAN ODT) 4 MG TABLET DISPERSIBLE       Keep well hydrated    Educated in proper administration of medication(s) ordered today including safety, possible SE, risks, benefits, rationale and alternatives to therapy.     BRAT type diet     Return to urgent care clinic or PCP 5-7  days if current symptoms are not resolving in a satisfactory manner or sooner if new or worsening symptoms occur. Differential diagnosis, natural history, supportive care, and indications for immediate follow-up discussed at length.   Advised of signs and symptoms which would warrant further evaluation and /or emergent evaluation in ER.    Verbalized agreement with this treatment plan and seemed to understand without barriers. Questions were encouraged and answered to patients satisfaction.

## 2020-04-27 ENCOUNTER — TELEPHONE (OUTPATIENT)
Dept: MEDICAL GROUP | Facility: PHYSICIAN GROUP | Age: 55
End: 2020-04-27

## 2020-04-27 DIAGNOSIS — B18.1 CHRONIC VIRAL HEPATITIS B WITHOUT DELTA AGENT AND WITHOUT COMA (HCC): ICD-10-CM

## 2020-04-27 NOTE — TELEPHONE ENCOUNTER
1. Caller Name: Misael Echevarria                          Call Back Number: [phone] (home)         2. SPECIFIC Action To Be Taken: Updated referral needed    3. Diagnosis/Clinical Reason for Request: Chronic viral hepatitis B without delta agent and without coma     4. Specialty & Provider Name/Lab/Imaging Location: [facility]     5. Is appointment scheduled for requested order/referral: yes - 04/30/2020    Patient was not informed they will receive a return phone call from the office ONLY if there are any questions before processing their request. Advised to call back if they haven't received a call from the referral department in 5 days.

## 2020-07-03 ENCOUNTER — HOSPITAL ENCOUNTER (OUTPATIENT)
Dept: RADIOLOGY | Facility: MEDICAL CENTER | Age: 55
End: 2020-07-03
Attending: PHYSICIAN ASSISTANT
Payer: COMMERCIAL

## 2020-07-03 ENCOUNTER — HOSPITAL ENCOUNTER (OUTPATIENT)
Dept: LAB | Facility: MEDICAL CENTER | Age: 55
End: 2020-07-03
Attending: PHYSICIAN ASSISTANT
Payer: COMMERCIAL

## 2020-07-03 DIAGNOSIS — B18.1 CHRONIC HEPATITIS B (HCC): ICD-10-CM

## 2020-07-03 LAB
HBV SURFACE AB SERPL IA-ACNC: 97.6 MIU/ML (ref 0–10)
HBV SURFACE AG SER QL: NORMAL

## 2020-07-03 PROCEDURE — 87517 HEPATITIS B DNA QUANT: CPT

## 2020-07-03 PROCEDURE — 82107 ALPHA-FETOPROTEIN L3: CPT

## 2020-07-03 PROCEDURE — 86706 HEP B SURFACE ANTIBODY: CPT

## 2020-07-03 PROCEDURE — 87340 HEPATITIS B SURFACE AG IA: CPT

## 2020-07-03 PROCEDURE — 76705 ECHO EXAM OF ABDOMEN: CPT

## 2020-07-03 PROCEDURE — 36415 COLL VENOUS BLD VENIPUNCTURE: CPT

## 2020-07-09 LAB
HBV DNA SERPL NAA+PROBE-ACNC: NOT DETECTED IU/ML
HBV DNA SERPL NAA+PROBE-LOG IU: NOT DETECTED LOG IU/ML
HBV DNA SERPL QL NAA+PROBE: NOT DETECTED

## 2020-07-11 LAB
AFP L3 MFR SERPL: <0.5 % (ref 0–9.9)
AFP SERPL-MCNC: 2 NG/ML (ref 0–15)

## 2021-01-09 ENCOUNTER — APPOINTMENT (OUTPATIENT)
Dept: RADIOLOGY | Facility: IMAGING CENTER | Age: 56
End: 2021-01-09
Attending: NURSE PRACTITIONER
Payer: COMMERCIAL

## 2021-01-09 ENCOUNTER — OCCUPATIONAL MEDICINE (OUTPATIENT)
Dept: URGENT CARE | Facility: PHYSICIAN GROUP | Age: 56
End: 2021-01-09
Payer: OTHER MISCELLANEOUS

## 2021-01-09 ENCOUNTER — APPOINTMENT (OUTPATIENT)
Dept: RADIOLOGY | Facility: IMAGING CENTER | Age: 56
End: 2021-01-09
Attending: NURSE PRACTITIONER
Payer: OTHER MISCELLANEOUS

## 2021-01-09 VITALS
HEIGHT: 71 IN | HEART RATE: 100 BPM | RESPIRATION RATE: 18 BRPM | BODY MASS INDEX: 24.08 KG/M2 | WEIGHT: 172 LBS | SYSTOLIC BLOOD PRESSURE: 160 MMHG | TEMPERATURE: 98.7 F | OXYGEN SATURATION: 97 % | DIASTOLIC BLOOD PRESSURE: 82 MMHG
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
## 2021-01-09 DIAGNOSIS — M25.511 ACUTE PAIN OF RIGHT SHOULDER: ICD-10-CM

## 2021-01-09 LAB
BREATH ALCOHOL COMMENT: NORMAL
POC BREATHALIZER: 0 PERCENT (ref 0–0.01)

## 2021-01-09 PROCEDURE — 82075 ASSAY OF BREATH ETHANOL: CPT | Mod: 29 | Performed by: NURSE PRACTITIONER

## 2021-01-09 PROCEDURE — 73030 X-RAY EXAM OF SHOULDER: CPT | Mod: TC,RT | Performed by: NURSE PRACTITIONER

## 2021-01-09 PROCEDURE — 99214 OFFICE O/P EST MOD 30 MIN: CPT | Mod: 29 | Performed by: NURSE PRACTITIONER

## 2021-01-09 ASSESSMENT — ENCOUNTER SYMPTOMS
MYALGIAS: 1
ORTHOPNEA: 0
FALLS: 1
BRUISES/BLEEDS EASILY: 0
CHILLS: 0
PALPITATIONS: 0
SHORTNESS OF BREATH: 0
TINGLING: 1
SENSORY CHANGE: 1
WEAKNESS: 0
FEVER: 0

## 2021-01-09 NOTE — LETTER
[facility]  [address] [place] [address]. [address] - [place] [address]  Phone:  [phone] - Fax:  [phone]   [facility] Progress Report and Disability Certification  Date of Service: 1/9/2021   No Show:  No  Date / Time of Next Visit: 1/14/2021@9:00AM   Claim Information   Patient Name: Misael Echevarria  Claim Number:     Employer: [employer]  Date of Injury: 1/4/2021     Insurer / TPA: [employer] ID / SSN:     Occupation:   Diagnosis: Diagnoses of Acute pain of right shoulder and Fall from slip, trip, or stumble, initial encounter were pertinent to this visit.    Medical Information   Related to Industrial Injury? Yes    Subjective Complaints:  DOI 1/4/21. States walked into a door, slipped on wet floor. Complains of right shoulder and elbow pain. States feel backwards on right side. Had swelling and pain at olecranon process. Denies bruising or deformity. Showed me picture of elbow on day of incident: swelling but no redness, bruising or deformity seen. Unable to lift right arm above shoulder height due to pain that radiates to upper right side back muscles and down right upper extremity of tricep region. States feels tightness in right upper back/shoulder muscles. States experiencing numbness/tingling from shoulder to right hand. Intermittent use of Ibuprofen 800 mg and ice application to elbow. 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 Right handed dominant. No previous injury to right upper extremity or secondary job.    Objective Findings: A/O x 3. Skin p/w/d, skin sensation intact. Decreased range of motion of right shoulder joint with lateral and front arm raise to shoulder height only due to discomfort. FROM of right elbow. Tenderness on palpation of right olecranon without redness,  swelling, bruising or deformity. Equal hand . Tenderness on palpation of anterior, posterior, AC region of right shoulder joint as well as deltoid muscle. Needs assistance with getting shirt off of right arm but was able to get back on myself. No swelling, skin discolorations or deformity of shoulder joint. No muscle spasm felt at upper right trapezius muscle.   Pre-Existing Condition(s):     Assessment:   Initial Visit    Status: Additional Care Required  Permanent Disability:No    Plan:      Diagnostics: X-ray    Comments:  Right shoulder xray:IMPRESSION:   Mild glenohumeral, mild to moderate acromioclavicular joint osteoarthritis   Calcification overlying the rotator cuff suspicious for calcific tendinopathy favored over bursitis   No radiographic evidence of acute dis  placed fracture    Disability Information   Status: Released to Restricted Duty    From:  2021  Through: 2021 Restrictions are: Temporary   Physical Restrictions   Sitting:    Standing:    Stooping:    Bending:      Squatting:    Walking:    Climbing:    Pushin hrs/day   Pullin hrs/day Other:    Reaching Above Shoulder (L):   Reaching Above Shoulder (R): 0 hrs/day     Reaching Below Shoulder (L):    Reaching Below Shoulder (R):  0 hrs/day   Not to exceed Weight Limits   Carrying(hrs):   Weight Limit(lb): < or = to 10 pounds Lifting(hrs):   Weight  Limit(lb): < or = to 10 pounds   Comments: Work restrictions limited to right arm   Continue to use remote control use when driving, monitor for inability to use right arm to assist driving- must recheck before next work day  May use Ibuprofen 600 mg every 6 hrs ro 800 mg everyy 8 hrs  May use ice application as needed for any swelling or throbbing pain  May use heat as needed for muscle/joint stiffness  May use topical pain reliever as needed for localized pain relief like [place]  Recheck on 21   Repetitive Actions   Hands: i.e. Fine Manipulations from Grasping:     Feet:  i.e. Operating Foot Controls:     Driving / Operate Machinery:     Provider Name:   ZACHARIAH Ramos Physician Signature:  Physician Name:     Clinic Name / Location: [address]  [place] [address] Clinic Phone Number: Dept: [phone]   Appointment Time: 12:25 Pm Visit Start Time: 12:37 PM   Check-In Time:  12:34 Pm Visit Discharge Time:  2:18PM   Original-Treating Physician or Chiropractor    Page 2-Insurer/TPA    Page 3-Employer    Page 4-Employee

## 2021-01-09 NOTE — LETTER
"EMPLOYEE’S CLAIM FOR COMPENSATION/ REPORT OF INITIAL TREATMENT  FORM C-4    EMPLOYEE’S CLAIM - PROVIDE ALL INFORMATION REQUESTED   First Name  Misael Last Name  Wale Birthdate                    1965                Sex  male Claim Number   Home Address  [address] Age  55 y.o. Height  1.791 m (5' 10.5\") Weight  78 kg (172 lb) [facility]     [facility] Zip  [address] Telephone  [phone] (home)    Mailing Address  [address] [facility] Zip  [address] Primary Language Spoken  English    Insurer   Third Party   Rober[employer]   Employee's Occupation (Job Title) When Injury or Occupational Disease Occurred      Employer's Name  [employer]  Telephone  [phone]    Employer Address  [address]  Zip  [address]    Date of Injury  1/4/2021               Hour of Injury  5:15 PM Date Employer Notified  1/4/2021 Last Day of Work after Injury     or Occupational Disease  1/8/2021 Supervisor to Whom Injury     Reported  [place]   Address or Location of Accident (if applicable)  [[address] ]   What were you doing at the time of accident? (if applicable)  Dropping off envelope    How did this injury or occupational disease occur? (Be specific an answer in detail. Use additional sheet if necessary)  I walked in the door the floor was wet and I slipped   If you believe that you have an occupational disease, when did you first have knowledge of the disability and it relationship to your employment?  N/A Witnesses to the Accident  None      Nature of Injury or Occupational Disease  Sprain  Part(s) of Body Injured or Affected  Elbow (R), Shoulder (R), Upper Back Area (Thoracic Area)    I certify that the above is true and correct to the best of my knowledge and that I have provided this information in order to " obtain the benefits of [place]  I hereby authorize any physician, chiropractor, surgeon, practitioner, or other person, any hospital, including [facility] or [facility] hospital, any medical service organization, any insurance company, or other institution or organization to release to each other, any medical or other information, including benefits paid or payable, pertinent to this injury or disease, except information relative to diagnosis, treatment and/or counseling for AIDS, psychological conditions, alcohol or controlled substances, for which I must give specific authorization.  A Photostat of this authorization shall be as valid as the original.     Date   Place   Employee’s Signature   THIS REPORT MUST BE COMPLETED AND MAILED WITHIN 3 WORKING DAYS OF TREATMENT   Place  [facility]  Name of Facility  [place]   Date  1/9/2021 Diagnosis  (M25.511) Acute pain of right shoulder  (W01.0XXA) Fall from slip, trip, or stumble, initial encounter Is there evidence the injured employee was under the              influence of alcohol and/or another controlled substance at the time of accident?   Hour  12:37 PM Description of Injury or Disease  Diagnoses of Acute pain of right shoulder and Fall from slip, trip, or stumble, initial encounter were pertinent to this visit. 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 No   X-Ray Findings  Negative   If Yes   From Date  To Date      From information given by the employee, together with medical evidence, can you directly connect this injury or occupational disease as job incurred?  Yes If No Full Duty    No Modified Duty  Yes   Is additional medical care by a physician indicated?  Yes If Modified Duty, Specify any Limitations / Restrictions  No push/pull, reach above and below with right arm, no lift/carry > 10 pounds.   Do you know of any previous injury or disease contributing to this condition or occupational disease?                            No   Date  1/9/2021 Print Doctor’s Name   ZACHARIAH Ramos I certify the employer’s copy of  this form was mailed on:   Address  [address] [place] [address]. [address] Insurer’s Use Only     [facility]  [id number]    Provider’s Tax ID Number  [id number] Telephone  Dept: [phone]      e-CYNDY Medrano  Signature:     Degree          ORIGINAL-TREATING PHYSICIAN OR CHIROPRACTOR    PAGE 2-INSURER/TPA    PAGE 3-EMPLOYER    PAGE 4-EMPLOYEE        Form C-4 (rev.10/07)           BRIEF DESCRIPTION OF RIGHTS AND BENEFITS  (Pursuant to [place] 616[place])    Notice of Injury or Occupational Disease (Incident Report Form C-1): If an injury or occupational disease (OD) arises out of and in the course of employment, you must provide written notice to your employer as soon as practicable, but no later than 7 days after the accident or OD. Your employer shall maintain a sufficient supply of the required forms.    Claim for Compensation (Form C-4): If medical treatment is sought, the form C-4 is available at the place of initial treatment. A completed \"Claim for Compensation\" (Form C-4) must be filed within 90 days after an accident or OD. The treating physician or chiropractor must, within 3 working days after treatment, complete and mail to the employer, the employer's insurer and third-party " , the Claim for Compensation.    Medical Treatment: If you require medical treatment for your on-the-job injury or OD, you may be required to select a physician or chiropractor from a list provided by your workers’ compensation insurer, if it has contracted with an Organization for Managed Care (MCO) or Preferred Provider Organization (PPO) or providers of health care. If your employer has not entered into a contract with an MCO or PPO, you may select a physician or chiropractor from the Panel of Physicians and Chiropractors. Any medical costs related to your industrial injury or OD will be paid by your insurer.    Temporary Total Disability (TTD): If your doctor has certified that you are unable to work for a period of at least 5 consecutive days, or 5 cumulative days in a 20-day period, or places restrictions on you that your employer does not accommodate, you may be entitled to TTD compensation.    Temporary Partial Disability (TPD): If the wage you receive upon reemployment is less than the compensation for TTD to which you are entitled, the insurer may be required to pay you TPD compensation to make up the difference. TPD can only be paid for a maximum of 24 months.    Permanent Partial Disability (PPD): When your medical condition is stable and there is an indication of a PPD as a result of your injury or OD, within 30 days, your insurer must arrange for an evaluation by a rating physician or chiropractor to determine the degree of your PPD. The amount of your PPD award depends on the date of injury, the results of the PPD evaluation, your age and wage.    Permanent Total Disability (PTD): If you are medically certified by a treating physician or chiropractor as permanently and totally disabled and have been granted a PTD status by your insurer, you are entitled to receive monthly benefits not to exceed 66 2/3% of your average monthly wage. The amount of your PTD payments is subject to reduction  if you previously received a lump-sum PPD award.    Vocational Rehabilitation Services: You may be eligible for vocational rehabilitation services if you are unable to return to the job due to a permanent physical impairment or permanent restrictions as a result of your injury or occupational disease.    Transportation and Per Avila Reimbursement: You may be eligible for travel expenses and per avila associated with medical treatment.    Reopening: You may be able to reopen your claim if your condition worsens after claim closure.     Appeal Process: If you disagree with a written determination issued by the insurer or the insurer does not respond to your request, you may appeal to the Department of Administration, , by following the instructions contained in your determination letter. You must appeal the determination within 70 days from the date of the determination letter at [address], [address], [place], [place] [address], or [address], [address], [place] [address]. If you disagree with the  decision, you may appeal to the Department of Administration, . You must file your appeal within 30 days from the date of the  decision letter at [address], [address], [place], [place] [address], or [address], [address], [place], [place] [address]. If you disagree with a decision of an , you may file a petition for judicial review with the District Court. You must do so within 30 days of the Appeal Officer’s decision. You may be represented by an  at your own expense or you may contact the [facility] for possible representation.    [place]  for Injured Workers (NAIW): If you disagree with a  decision, you may request that NAIW represent you without charge at an  Hearing. For information regarding denial of benefits, you may contact the [facility] at: [address], [address], [place] [address], [phone], or [address], [address], [place] [address], [phone]    To File a Complaint with the Division: If you wish to file a complaint with the  of the Division of Industrial Relations (DIR),  please contact the Workers’ Compensation Section, [address], [address], [place], [place] [address], telephone [phone], or [address], [address], [place], [place] [address], telephone [phone].    For assistance with Workers’ Compensation Issues: You may contact the [facility] Office for Consumer Health Assistance, [address], [facility] [address] [facility] [address], [place] [address], Toll Free 1-947.891.5982, Web site: [email or web address]/Programs/[email or web address] E-mail: [email or web address]              __________________________________________________________________                                    _________________            Employee Name / Signature                                                                                                                            Date                                                                                                                                                                                                                              D-2 (rev. 10/20)

## 2021-01-09 NOTE — PROGRESS NOTES
"Subjective:      Misael Echevarria is a 55 y.o. male who presents with Work-Related Injury (New WC DOI 01/04/2021 patient walked in the door, floor was wet slipped and injured right elbow, shoulder, back and neck )      DOI 1/4/21. States walked into a door, slipped on wet floor. Complains of right shoulder and elbow pain. States feel backwards on right side. Had swelling and pain at olecranon process. Denies bruising or deformity. Showed me picture of elbow on day of incident: swelling but no redness, bruising or deformity seen. Unable to lift right arm above shoulder height due to pain that radiates to upper right side back muscles and down right upper extremity of tricep region. States feels tightness in right upper back/shoulder muscles. States experiencing numbness/tingling from shoulder to right hand. Intermittent use of Ibuprofen 800 mg and ice application to elbow. 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 Right handed dominant. No previous injury to right upper extremity or secondary job.      HPI  PMH: No pertinent past medical history to this problem  MEDS: Medications were reviewed in Epic  ALLERGIES: Allergies were reviewed in Epic  FH: No pertinent family history to this problem         Review of Systems   Constitutional: Negative for chills, fever and malaise/fatigue.   Respiratory: Negative for shortness of breath.    Cardiovascular: Negative for chest pain, palpitations and orthopnea.   Musculoskeletal: Positive for falls, joint pain and myalgias.   Skin: Negative for itching and rash.   Neurological: Positive for tingling and sensory change. Negative for weakness.   Endo/Heme/Allergies: Does not bruise/bleed easily.   All other systems reviewed and are negative.         Objective:     /82   Pulse 100   Temp 37.1 °C (98.7 °F) (Temporal)   Resp 18   Ht 1.791 m (5' 10.5\")   Wt 78 " kg (172 lb)   SpO2 97%   BMI 24.33 kg/m²      Physical Exam  Vitals signs reviewed.   Constitutional:       General: He is awake. He is not in acute distress.     Appearance: Normal appearance. He is well-developed. He is not ill-appearing, toxic-appearing or diaphoretic.   HENT:      Head: Normocephalic.   Eyes:      Pupils: Pupils are equal, round, and reactive to light.   Cardiovascular:      Rate and Rhythm: Normal rate.   Pulmonary:      Effort: Pulmonary effort is normal.   Musculoskeletal:      Right shoulder: He exhibits decreased range of motion, tenderness, pain and decreased strength. He exhibits no bony tenderness, no swelling, no effusion, no crepitus, no deformity, no laceration, no spasm and normal pulse.      Right elbow: He exhibits normal range of motion, no swelling, no effusion, no deformity and no laceration. Tenderness found. Olecranon process tenderness noted.   Skin:     General: Skin is warm and dry.      Findings: No erythema or rash.   Neurological:      Mental Status: He is alert and oriented to person, place, and time.   Psychiatric:         Behavior: Behavior is cooperative.         A/O x 3. Skin p/w/d, skin sensation intact. Decreased range of motion of right shoulder joint with lateral and front arm raise to shoulder height only due to discomfort. FROM of right elbow. Tenderness on palpation of right olecranon without redness, swelling, bruising or deformity. Equal hand . Tenderness on palpation of anterior, posterior, AC region of right shoulder joint as well as deltoid muscle. Needs assistance with getting shirt off of right arm but was able to get back on myself. No swelling, skin discolorations or deformity of shoulder joint. No muscle spasm felt at upper right trapezius muscle.       Assessment/Plan:        1. Acute pain of right shoulder    - DX-SHOULDER 2+ RIGHT; Future  - POCT Breath Alcohol Test    2. Fall from slip, trip, or stumble, initial encounter    - DX-SHOULDER  2+ RIGHT; Future  - POCT Breath Alcohol Test  Work restrictions limited to right arm   Continue to use remote control use when driving, monitor for inability to use right arm to assist driving- must recheck before next work day  May use Ibuprofen 600 mg every 6 hrs ro 800 mg everyy 8 hrs  May use ice application as needed for any swelling or throbbing pain  May use heat as needed for muscle/joint stiffness  May use topical pain reliever as needed for localized pain relief like Salon Pas  Recheck on 1/13/21

## 2021-01-14 ENCOUNTER — OCCUPATIONAL MEDICINE (OUTPATIENT)
Dept: URGENT CARE | Facility: PHYSICIAN GROUP | Age: 56
End: 2021-01-14
Payer: OTHER MISCELLANEOUS

## 2021-01-14 VITALS
WEIGHT: 170 LBS | HEART RATE: 103 BPM | DIASTOLIC BLOOD PRESSURE: 78 MMHG | HEIGHT: 70 IN | SYSTOLIC BLOOD PRESSURE: 148 MMHG | BODY MASS INDEX: 24.34 KG/M2 | TEMPERATURE: 98.5 F | OXYGEN SATURATION: 97 % | RESPIRATION RATE: 16 BRPM
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
## 2021-01-14 DIAGNOSIS — S46.911D RIGHT SHOULDER STRAIN, SUBSEQUENT ENCOUNTER: ICD-10-CM

## 2021-01-14 PROCEDURE — 99213 OFFICE O/P EST LOW 20 MIN: CPT | Performed by: PHYSICIAN ASSISTANT

## 2021-01-14 ASSESSMENT — ENCOUNTER SYMPTOMS
TINGLING: 1
LOSS OF CONSCIOUSNESS: 0
FOCAL WEAKNESS: 0
FALLS: 1
SENSORY CHANGE: 0

## 2021-01-14 NOTE — PROGRESS NOTES
"Subjective:   Misael Echevarria  is a 55 y.o. male who presents for Shoulder Pain (work-related injury on right shoulder. )    DOI:1/4/21  Second visit.  JULI: Slip and fall with right shoulder and elbow pain.  Patient returns today for first follow-up visit and reports significant improvement in symptoms.  Patient has been working with work restrictions without difficulty.  He does state that he has had a friend who is a massage therapist work on the muscles of the shoulder which seems to have helped dramatically.  Patient reports significant improvement in symptoms.  He does still have mild tingling of the right posterior shoulder.  He has been taking ibuprofen only rarely.  Patient is right-hand dominant.   Shoulder Pain      Review of Systems   Musculoskeletal: Positive for falls and joint pain.   Neurological: Positive for tingling. Negative for sensory change, focal weakness and loss of consciousness.   All other systems reviewed and are negative.    No Known Allergies  Reviewed past medical, surgical , social and family history.  Reviewed prescription and over-the-counter medications with patient and electronic health record today.     Objective:   /78 (BP Location: Right arm, Patient Position: Sitting, BP Cuff Size: Adult)   Pulse (!) 103   Temp 36.9 °C (98.5 °F) (Temporal)   Resp 16   Ht 1.778 m (5' 10\")   Wt 77.1 kg (170 lb)   SpO2 97%   BMI 24.39 kg/m²   Physical Exam  Vitals signs reviewed.   Constitutional:       General: He is not in acute distress.     Appearance: He is well-developed. He is not ill-appearing or toxic-appearing.   HENT:      Head: Normocephalic and atraumatic.      Jaw: There is normal jaw occlusion.      Right Ear: External ear normal.      Left Ear: External ear normal.      Nose: Nose normal.      Mouth/Throat:      Mouth: Mucous membranes are moist.   Eyes:      General: Lids are normal.      Extraocular Movements: Extraocular movements intact.      Conjunctiva/sclera: " Conjunctivae normal.      Pupils: Pupils are equal, round, and reactive to light.   Neck:      Musculoskeletal: Normal range of motion and neck supple.   Cardiovascular:      Rate and Rhythm: Normal rate and regular rhythm.      Heart sounds: Normal heart sounds.   Pulmonary:      Effort: Pulmonary effort is normal. No respiratory distress.      Breath sounds: Normal breath sounds.   Musculoskeletal: Normal range of motion.   Skin:     General: Skin is warm and dry.      Findings: No rash.   Neurological:      Mental Status: He is alert and oriented to person, place, and time.      Cranial Nerves: No cranial nerve deficit.      Sensory: Sensation is intact. No sensory deficit.      Motor: Motor function is intact.      Coordination: Coordination is intact.   Psychiatric:         Attention and Perception: Attention normal.         Mood and Affect: Mood and affect normal.         Speech: Speech normal.         Behavior: Behavior normal.         Thought Content: Thought content normal.         Cognition and Memory: Cognition normal.         Judgment: Judgment normal.       Cervical spine: No step-off or deformity, no spinous process tenderness.  Full range of motion without limitation or pain.  Right shoulder: No soft tissue swelling, ecchymosis or deformity noted.  Full range of motion without limitation or pain.  Mild tenderness to palpation over the superior trapezius.  No pain with resisted abduction.  Negative drop arm, negative empty can, negative Neer's.  Distal neurovascular intact.  Right elbow: No soft tissue swelling, ecchymosis or deformity noted.  Full range of motion without limitation or pain.  No reproducible tenderness.   Assessment/Plan:   1. Right shoulder strain, subsequent encounter    2. Fall from slipping, subsequent encounter     Patient has shown significant improvement.  We will go ahead and lift work restrictions somewhat as noted above.  Patient may continue to work on gentle stretching and  range of motion.  May continue over-the-counter ibuprofen if needed for pain, topical analgesics such as Biofreeze or BenGay, ice.  Follow-up 1 week for reevaluation.    Previous medical records including previous Workmen's Compensation forms, previous office visit and previous images are reviewed by myself personally today.    [place] D-39 completed.     Upon entering exam room I ensured patient was wearing a mask.  This provider wore appropriate PPE throughout entire visit.  Patient wore mask entire visit except for a brief period while examining oropharynx.    Differential diagnosis, natural history, supportive care, and indications for immediate follow-up discussed.     Red flag warning symptoms and strict ER/follow-up precautions given.  The patient demonstrated a good understanding and agreed with the treatment plan.  Please note that this note was created using voice recognition speech to text software. Every effort has been made to correct obvious errors.  However, I expect there are errors of grammar and possibly context that were not discovered prior to finalizing the note  JAKE Adamson PA-C

## 2021-01-14 NOTE — LETTER
[facility]  [address] [place] [address]. [address] - [place] [address]  Phone:  [phone] - Fax:  [phone]   [facility] Progress Report and Disability Certification  Date of Service: 1/14/2021   No Show:  No  Date / Time of Next Visit: 1/21/2021@ 9:00 AM   Claim Information   Patient Name: Misael Echevarria  Claim Number:     Employer: [employer]  Date of Injury: 1/4/2021     Insurer / TPA: M[employer] Workers Comp  ID / SSN:     Occupation:   Diagnosis: Diagnoses of Right shoulder strain, subsequent encounter and Fall from slipping, subsequent encounter were pertinent to this visit.    Medical Information   Related to Industrial Injury? Yes    Subjective Complaints:  DOI:1/4/21  Second visit.  JULI: Slip and fall with right shoulder and elbow pain.  Patient returns today for first follow-up visit and reports significant improvement in symptoms.  Patient has been working with work restrictions without difficulty.  He does state that he has had a friend who is a massage therapist work on the muscles of the shoulder which seems to have helped dramatically.  Patient reports significant improvement in symptoms.  He does still have mild tingling of the right posterior shoulder.  He has been taking ibuprofen only rarely.  Patient is right-hand dominant.   Objective Findings: Cervical spine: No step-off or deformity, no spinous process tenderness.  Full range of motion without limitation or pain.  Right shoulder: No soft tissue swelling, ecchymosis or deformity noted.  Full range of motion without limitation or pain.  Mild tenderness to palpation over the superior trapezius.  No pain with resisted abduction.  Negative drop arm, negative empty can, negative Neer's.  Distal neurovascular intact.  Right elbow: No soft tissue swelling, ecchymosis or deformity noted.  Full range of motion without limitation or pain.  No reproducible tenderness.      Pre-Existing Condition(s):     Assessment:   Condition Improved    Status: Additional Care Required  Permanent Disability:No    Plan:      Diagnostics: X-ray    Comments:  X-ray performed on initial visit with findings as follows:  IMPRESSION:     Mild glenohumeral, mild to moderate acromioclavicular joint osteoarthritis     Calcification overlying the rotator cuff suspicious for calcific tendinopathy favored over burs  itis     No radiographic evidence of acute displaced fracture    Disability Information   Status: Released to Restricted Duty    From:  1/14/2021  Through: 1/21/2021 Restrictions are: Temporary   Physical Restrictions   Sitting:    Standing:    Stooping:    Bending:      Squatting:    Walking:    Climbing:    Pushing:  < or = to 4 hrs/day  Comments:Right arm   Pulling:  < or = to 4 hrs/day  Comments:Right arm Other:    Reaching Above Shoulder (L):   Reaching Above Shoulder (R): < or = 4 hrs/day     Reaching Below Shoulder (L):    Reaching Below Shoulder (R):  < or = to 4 hrs/day   Not to exceed Weight Limits   Carrying(hrs):   Weight Limit(lb): < or = to 25 pounds Lifting(hrs):   Weight  Limit(lb): < or = to 25 pounds   Comments: Patient has shown significant improvement.  We will go ahead and lift work restrictions somewhat as noted above.  Patient may continue to work on gentle stretching and range of motion.  May continue over-the-counter ibuprofen if needed for pain, topical analgesics such as Biofreeze or BenGay, ice.  Follow-up 1 week for reevaluation.    Repetitive Actions   Hands: i.e. Fine Manipulations from Grasping:     Feet: i.e. Operating Foot Controls:     Driving / Operate Machinery:     Provider Name:   Darlene Adamson P.A.-C. Physician Signature:  Physician Name:     Clinic Name / Location: [address]  [place] [address] Clinic Phone Number: Dept: [phone]   Appointment Time: 9:00 Am Visit Start Time: 9:01 AM   Check-In Time:   9:00 Am Visit Discharge Time: 9:25 AM   Original-Treating Physician or Chiropractor    Page 2-Insurer/TPA    Page 3-Employer    Page 4-Employee

## 2021-01-21 ENCOUNTER — OCCUPATIONAL MEDICINE (OUTPATIENT)
Dept: URGENT CARE | Facility: PHYSICIAN GROUP | Age: 56
End: 2021-01-21
Payer: OTHER MISCELLANEOUS

## 2021-01-21 VITALS
DIASTOLIC BLOOD PRESSURE: 80 MMHG | WEIGHT: 171 LBS | HEIGHT: 70 IN | HEART RATE: 78 BPM | SYSTOLIC BLOOD PRESSURE: 142 MMHG | TEMPERATURE: 98.7 F | RESPIRATION RATE: 16 BRPM | OXYGEN SATURATION: 98 % | BODY MASS INDEX: 24.48 KG/M2

## 2021-01-21 DIAGNOSIS — S46.911D RIGHT SHOULDER STRAIN, SUBSEQUENT ENCOUNTER: ICD-10-CM
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
## 2021-01-21 PROCEDURE — 99214 OFFICE O/P EST MOD 30 MIN: CPT | Mod: 29 | Performed by: NURSE PRACTITIONER

## 2021-01-21 ASSESSMENT — ENCOUNTER SYMPTOMS: SENSORY CHANGE: 0

## 2021-01-21 NOTE — PROGRESS NOTES
"Subjective:      Misael Echevarria is a 55 y.o. male who presents with Follow-Up ([facility], feels so much better, has been working all his hours, does have some soreness but otherwise feels good )              DOI: 1/4/2020. Patient reports improvement in right shoulder symptoms. Mild pain 1-2/10. Pain primarily to posterior shoulder, States pain is less on the weekends when able to rest. Reports some weakness in ROM with \"hooking motion,\" in lifting motion. Strong . No numbness. Using salonpas pathces, and occasional ibuprofen with relief. Also using heating pad at night. Alfredo previous right shoulder injury. Denies second job.            Review of Systems   Musculoskeletal: Positive for joint pain.   Neurological: Negative for sensory change.   All other systems reviewed and are negative.         Objective:     /80 (BP Location: Right arm, Patient Position: Sitting, BP Cuff Size: Adult)   Pulse 78   Temp 37.1 °C (98.7 °F) (Temporal)   Resp 16   Ht 1.778 m (5' 10\")   Wt 77.6 kg (171 lb)   SpO2 98%   BMI 24.54 kg/m²      Physical Exam  Vitals signs reviewed.   Constitutional:       General: He is not in acute distress.     Appearance: He is well-developed.   HENT:      Head: Normocephalic and atraumatic.      Right Ear: External ear normal.      Left Ear: External ear normal.      Nose: Nose normal.   Eyes:      Conjunctiva/sclera: Conjunctivae normal.   Neck:      Musculoskeletal: Normal range of motion. No spinous process tenderness.   Cardiovascular:      Rate and Rhythm: Normal rate.      Pulses:           Radial pulses are 2+ on the right side.   Pulmonary:      Effort: Pulmonary effort is normal.   Musculoskeletal: Normal range of motion.         General: Tenderness present. No swelling.      Right shoulder: He exhibits tenderness and swelling. He exhibits normal range of motion, no effusion, no crepitus and normal pulse.      Cervical back: He exhibits normal range of motion, no bony tenderness, " no swelling and no deformity.      Right hand: Normal strength noted. He exhibits no thumb/finger opposition.      Comments: Tenderness to palpation over right trapezius and posterior shoulder. Mild tenderness to palpation to right scapula and paraspinal thoracic area. Strong , 5/5. No deficit with supination and pronation. Has full ROM, reported increased in pain with forward flexion above 120 degrees. Distal neurovascular intact.     Skin:     General: Skin is warm and dry.      Findings: No rash.   Neurological:      General: No focal deficit present.      Mental Status: He is alert and oriented to person, place, and time.      GCS: GCS eye subscore is 4. GCS verbal subscore is 5. GCS motor subscore is 6.   Psychiatric:         Mood and Affect: Mood normal.         Speech: Speech normal.         Behavior: Behavior normal.         Thought Content: Thought content normal.         Judgment: Judgment normal.                 Assessment/Plan:        1. Fall from slipping, subsequent encounter  - REFERRAL TO OCCUPATIONAL MEDICINE    2. Right shoulder strain, subsequent encounter  - REFERRAL TO OCCUPATIONAL MEDICINE    IMPRESSION:     Mild glenohumeral, mild to moderate acromioclavicular joint osteoarthritis     Calcification overlying the rotator cuff suspicious for calcific tendinopathy favored over burs  itis     -NSAID's (ibuprofen) and tylenol as directed for pain and inflammation.   -Salonpas as directed.  -Temperature therapy: Ice or heat, which ever feels better.  -Gentle range of motion exercises.  -Follow up in 1 week    Follow up emergently for severe uncontrolled pain, neurovascular compromise (decreased sensation, motion, or circulation).

## 2021-01-21 NOTE — LETTER
"   [facility]  [address] [place] [address]. [address] - [place] [address]  Phone:  [phone] - Fax:  [phone]   [facility] Progress Report and Disability Certification  Date of Service: 1/21/2021   No Show:  No  Date / Time of Next Visit: 1/28/2021   Claim Information   Patient Name: Misael Echevarria  Claim Number:     Employer: [employer] *** Date of Injury: 1/4/2021     Insurer / TPA: Misc Workers Comp *** ID / SSN:     Occupation:  *** Diagnosis: Diagnoses of Fall from slipping, subsequent encounter and Right shoulder strain, subsequent encounter were pertinent to this visit.    Medical Information   Related to Industrial Injury? Yes ***   Subjective Complaints:  DOI: 1/4/2020. Patient reports improvement in right shoulder symptoms. Mild pain 1-2/10. Pain primarily to posterior shoulder, States pain is less on the weekends when able to rest. Reports some weakness in ROM with \"hooking motion,\" in lifting motion. Strong . No numbness. Using salonpas pathces, and occasional ibuprofen with relief. Also using heating pad at night. Alfredo previous right shoulder injury. Denies second job,    Objective Findings: Physical Exam  Neck:      Musculoskeletal: No spinous process tenderness.   Cardiovascular:      Pulses:           Radial pulses are 2+ on the right side.   Musculoskeletal:      Right shoulder: He exhibits tenderness and swelling. He exhibits normal range of motion, no effusion, no crepitus and normal pulse.      Cervical back: He exhibits normal range of motion, no bony tenderness, no swelling and no deformity.      Right hand: Normal strength noted. He exhibits no thumb/finger opposition.      Comments: Tenderness to palpation over right trapezius and posterior shoulder. Mild tenderness to palpation to right scapula and paraspinal thoracic area. Strong , 5/5. No deficit with supination and pronation. Has full ROM, reported " increased in pain with forward flexion above 120 degrees. Distal neurovascular intact.     Neurological:      Mental Status: He is oriented to person, place, and time.    Pre-Existing Condition(s): No reported previous injury. Imaging indicates: Mild glenohumeral, mild to moderate acromioclavicular joint osteoarthritis. Calcification overlying the rotator cuff suspicious for calcific tendinopathy favored over bursitis.   Assessment:   Condition Improved    Status: Additional Care Required  Permanent Disability:No    Plan: MedicationTransfer Care    Diagnostics: X-ray    Comments:       Disability Information   Status: Released to Restricted Duty    From:  1/21/2021  Through: 1/28/2021 Restrictions are: Temporary   Physical Restrictions   Sitting:    Standing:    Stooping:    Bending:      Squatting:    Walking:    Climbing:    Pushing:  < or = to 4 hrs/day   Pulling:  < or = to 4 hrs/day Other:    Reaching Above Shoulder (L):   Reaching Above Shoulder (R): < or = 6 hrs/day     Reaching Below Shoulder (L):    Reaching Below Shoulder (R):  < or = to 6 hrs/day   Not to exceed Weight Limits   Carrying(hrs):   Weight Limit(lb): < or = to 25 pounds Lifting(hrs):   Weight  Limit(lb): < or = to 25 pounds   Comments:    -NSAID's (ibuprofen) and tylenol as directed for pain and inflammation.   -Salonpas as directed.  -Temperature therapy: Ice or heat, which ever feels better.  -Gentle range of motion exercises.  -Follow up in 1 week. REFERRAL TO OCCUPATIONAL MEDICINE    Repetitive Actions   Hands: i.e. Fine Manipulations from Grasping:     Feet: i.e. Operating Foot Controls:     Driving / Operate Machinery:     Provider Name:   ZACHARIAH Mariano Physician Signature:  Physician Name:     Clinic Name / Location: [facility] [address]  [place] [address] Clinic Phone Number: Dept: [phone]   Appointment Time: 9:00 Am Visit Start Time: 8:59 AM   Check-In Time:  8:54 Am Visit  Discharge Time:  ***   Original-Treating Physician or Chiropractor    Page 2-Insurer/TPA    Page 3-Employer    Page 4-Employee

## 2021-01-21 NOTE — LETTER
"   [facility]  [address] [place] [address]. [address] - [place] [address]  Phone:  [phone] - Fax:  [phone]   [facility] Progress Report and Disability Certification  Date of Service: 1/21/2021   No Show:  No  Date / Time of Next Visit: 1/28/2021@8:00 AM @ [facility]   Claim Information   Patient Name: Misael Echevarria  Claim Number:     Employer: [employer]  Date of Injury: 1/4/2021     Insurer / TPA: Alethea Lou  [employer] ID / SSN:     Occupation:   Diagnosis: Diagnoses of Fall from slipping, subsequent encounter and Right shoulder strain, subsequent encounter were pertinent to this visit.    Medical Information   Related to Industrial Injury? Yes    Subjective Complaints:  DOI: 1/4/2020. Patient reports improvement in right shoulder symptoms. Mild pain 1-2/10. Pain primarily to posterior shoulder, States pain is less on the weekends when able to rest. Reports some weakness in ROM with \"hooking motion,\" in lifting motion. Strong . No numbness. Using salonpas pathces, and occasional ibuprofen with relief. Also using heating pad at night. Alfredo previous right shoulder injury. Denies second job,    Objective Findings: Physical Exam  Neck:      Musculoskeletal: No spinous process tenderness.   Cardiovascular:      Pulses:           Radial pulses are 2+ on the right side.   Musculoskeletal:      Right shoulder: He exhibits tenderness and swelling. He exhibits normal range of motion, no effusion, no crepitus and normal pulse.      Cervical back: He exhibits normal range of motion, no bony tenderness, no swelling and no deformity.      Right hand: Normal strength noted. He exhibits no thumb/finger opposition.      Comments: Tenderness to palpation over right trapezius and posterior shoulder. Mild tenderness to palpation to right scapula and paraspinal thoracic area. Strong , 5/5. No deficit with supination and " pronation. Has full ROM, reported increased in pain with forward flexion above 120 degrees. Distal neurovascular intact.     Neurological:      Mental Status: He is oriented to person, place, and time.    Pre-Existing Condition(s): No reported previous injury. Imaging indicates: Mild glenohumeral, mild to moderate acromioclavicular joint osteoarthritis. Calcification overlying the rotator cuff suspicious for calcific tendinopathy favored over bursitis.   Assessment:   Condition Improved    Status: Additional Care Required  Permanent Disability:No    Plan: MedicationTransfer Care    Diagnostics: X-ray    Comments:       Disability Information   Status: Released to Restricted Duty    From:  1/21/2021  Through: 1/28/2021 Restrictions are: Temporary   Physical Restrictions   Sitting:    Standing:    Stooping:    Bending:      Squatting:    Walking:    Climbing:    Pushing:  < or = to 4 hrs/day   Pulling:  < or = to 4 hrs/day Other:    Reaching Above Shoulder (L):   Reaching Above Shoulder (R): < or = 6 hrs/day     Reaching Below Shoulder (L):    Reaching Below Shoulder (R):  < or = to 6 hrs/day   Not to exceed Weight Limits   Carrying(hrs):   Weight Limit(lb): < or = to 25 pounds Lifting(hrs):   Weight  Limit(lb): < or = to 25 pounds   Comments:    -NSAID's (ibuprofen) and tylenol as directed for pain and inflammation.   -Salonpas as directed.  -Temperature therapy: Ice or heat, which ever feels better.  -Gentle range of motion exercises.  -Follow up in 1 week. REFERRAL TO OCCUPATIONAL MEDICINE    Repetitive Actions   Hands: i.e. Fine Manipulations from Grasping:     Feet: i.e. Operating Foot Controls:     Driving / Operate Machinery:     Provider Name:   ZACHARIAH Mariano Physician Signature:  Physician Name:     Clinic Name / Location: [address]  [place] [address] Clinic Phone Number: Dept: [phone]   Appointment Time: 9:00 Am Visit Start Time: 8:59 AM      Check-In Time:  8:54 Am Visit Discharge Time: 10:08 AM   Original-Treating Physician or Chiropractor    Page 2-Insurer/TPA    Page 3-Employer    Page 4-Employee

## 2021-01-28 ENCOUNTER — OCCUPATIONAL MEDICINE (OUTPATIENT)
Dept: OCCUPATIONAL MEDICINE | Facility: CLINIC | Age: 56
End: 2021-01-28
Payer: OTHER MISCELLANEOUS

## 2021-01-28 VITALS
RESPIRATION RATE: 18 BRPM | HEART RATE: 85 BPM | OXYGEN SATURATION: 95 % | DIASTOLIC BLOOD PRESSURE: 78 MMHG | HEIGHT: 70 IN | SYSTOLIC BLOOD PRESSURE: 132 MMHG | TEMPERATURE: 98 F | WEIGHT: 171 LBS | BODY MASS INDEX: 24.48 KG/M2

## 2021-01-28 DIAGNOSIS — S46.911D RIGHT SHOULDER STRAIN, SUBSEQUENT ENCOUNTER: ICD-10-CM

## 2021-01-28 PROCEDURE — 99213 OFFICE O/P EST LOW 20 MIN: CPT | Performed by: NURSE PRACTITIONER

## 2021-01-28 NOTE — LETTER
[address],   [address] [place] [address]  Phone:  [phone] - Fax:  [phone]   Occupational Health [facility] Progress Report and Disability Certification  Date of Service: 1/28/2021   No Show:  No  Date / Time of Next Visit: 2/11/2021 @ 9 AM   Claim Information   Patient Name: Misael Echevarria  Claim Number:     Employer: [employer]  Date of Injury: 1/4/2021     Insurer / TPA: Misc Workers Comp  ID / SSN:     Occupation:   Diagnosis: The encounter diagnosis was Right shoulder strain, subsequent encounter.    Medical Information   Related to Industrial Injury? Yes    Subjective Complaints:  DOI 1/4/21. States walked into a door, slipped on wet floor. Today no improvement. He states that his pain is constant, radiates from front to back shoulder, throbbing, constricting, zapping, and worse with trying to lift. He denies numbness, tingling, or overall weakness. He takes Tylenol only as needed, would prefer not to take anything. He is using ice, massage, and salon pas pads with very minimal improvement. He is tolerating light duty. MRI ordered and physical therapy referral placed at this visit. Plan of care discussed with patient.    Objective Findings: Cervical spine: No step-off or deformity, no spinous process tenderness.  Full range of motion without limitation or pain.  Right shoulder: No soft tissue swelling, ecchymosis or deformity noted.  Full range of motion without limitation or pain.  Mild TTP over the superior trapezius.  No pain with resisted abduction.  Negative drop arm, negative empty can, negative Neer's.  Distal neurovascular intact.  Right elbow: No soft tissue swelling, ecchymosis or deformity noted.  Full range of motion without limitation or pain.  No reproducible tenderness.     Right shoulder Xray 1/4/21:   IMPRESSION:       Mild glenohumeral, mild to moderate acromioclavicular joint osteoarthritis        Calcification overlying the rotator cuff suspicious for calcific tendinopathy favored over bursitis       No radiographic evidence of acute displaced fracture     Pre-Existing Condition(s):     Assessment:   Condition Same    Status: Additional Care Required  Permanent Disability:No    Plan: PTDiagnostics    Diagnostics: MRI    Comments:  Follow-up in 2 weeks  Restricted duty  MRI ordered   Physical therapy referral placed  Recommend continue OTC Tylenol/Ibuprofen, ice/heat application, OTC topical ointments ie Tiger Balm, Icy-Hot, Voltaren Gel, and gentle range of motion and stretchi  ng exercises as tolerated     Disability Information   Status: Released to Restricted Duty    From:  1/28/2021  Through: 2/11/2021 Restrictions are: Temporary   Physical Restrictions   Sitting:    Standing:    Stooping:    Bending:      Squatting:    Walking:    Climbing:    Pushing:  < or = to 4 hrs/day   Pulling:  < or = to 4 hrs/day Other:    Reaching Above Shoulder (L):   Reaching Above Shoulder (R): < or = 6 hrs/day     Reaching Below Shoulder (L):    Reaching Below Shoulder (R):  < or = to 6 hrs/day   Not to exceed Weight Limits   Carrying(hrs):   Weight Limit(lb): < or = to 25 pounds Lifting(hrs):   Weight  Limit(lb): < or = to 25 pounds   Comments:      Repetitive Actions   Hands: i.e. Fine Manipulations from Grasping:     Feet: i.e. Operating Foot Controls:     Driving / Operate Machinery:     Provider Name:   ZACHARIAH Werner Physician Signature:  Physician Name:     Clinic Name / Location: [address],   [address] [place] [address] Clinic Phone Number: Dept: [phone]   Appointment Time: 8:00 Am Visit Start Time: 8:06 AM   Check-In Time:  7:52 Am Visit Discharge Time:  8:38 AM    Original-Treating Physician or Chiropractor    Page 2-Insurer/TPA    Page 3-Employer    Page 4-Employee

## 2021-01-28 NOTE — PROGRESS NOTES
"Subjective:     Misael Echevarria is a 55 y.o. male who presents for Follow-Up (WC new2u DOI 1/4/21 Rt elbow/shoulder, neck, back, same, rm 17)      DOI 1/4/21. States walked into a door, slipped on wet floor. Today no improvement. He states that his pain is constant, radiates from front to back shoulder, throbbing, constricting, zapping, and worse with trying to lift. He denies numbness, tingling, or overall weakness. He takes Tylenol only as needed, would prefer not to take anything. He is using ice, massage, and salon pas pads with very minimal improvement. He is tolerating light duty. MRI ordered and physical therapy referral placed at this visit. Plan of care discussed with patient.     ROS: All systems were reviewed on intake form, form was reviewed and signed. See scanned documents in media. Pertinent positives and negatives included in HPI.    PMH: No pertinent past medical history to this problem  MEDS: Medications were reviewed in Epic  ALLERGIES: No Known Allergies  SOCHX: Works as  at [employer]  FH: No pertinent family history to this problem       Objective:     /78   Pulse 85   Temp 36.7 °C (98 °F)   Resp 18   Ht 1.778 m (5' 10\")   Wt 77.6 kg (171 lb)   SpO2 95%   BMI 24.54 kg/m²     [unfilled]    Cervical spine: No step-off or deformity, no spinous process tenderness.  Full range of motion without limitation or pain.  Right shoulder: No soft tissue swelling, ecchymosis or deformity noted.  Full range of motion without limitation or pain.  Mild TTP over the superior trapezius.  No pain with resisted abduction.  Negative drop arm, negative empty can, negative Neer's.  Distal neurovascular intact.  Right elbow: No soft tissue swelling, ecchymosis or deformity noted.  Full range of motion without limitation or pain.  No reproducible tenderness.     Right shoulder Xray 1/4/21:   IMPRESSION:       Mild glenohumeral, mild to moderate acromioclavicular " joint osteoarthritis       Calcification overlying the rotator cuff suspicious for calcific tendinopathy favored over bursitis       No radiographic evidence of acute displaced fracture      Assessment/Plan:       1. Right shoulder strain, subsequent encounter  - MR-SHOULDER-W/O RIGHT; Future  - REFERRAL TO RADIOLOGY  - REFERRAL TO PHYSICAL THERAPY    Released to Restricted Duty FROM 1/28/2021 TO 2/11/2021     Follow-up in 2 weeks  Restricted duty  MRI ordered   Physical therapy referral placed  Recommend continue OTC Tylenol/Ibuprofen, ice/heat application, OTC topical ointments ie Tiger Balm, Icy-Hot, Voltaren Gel, and gentle range of motion and stretchi  ng exercises as tolerated     Differential diagnosis, natural history, supportive care, and indications for immediate follow-up discussed.    Approximately 25 minutes were spent in reviewing notes, preparing for visit, obtaining history, exam and evaluation, patient counseling/education and post visit documentation/orders.

## 2021-02-10 ENCOUNTER — OFFICE VISIT (OUTPATIENT)
Dept: MEDICAL GROUP | Facility: PHYSICIAN GROUP | Age: 56
End: 2021-02-10
Payer: COMMERCIAL

## 2021-02-10 VITALS
TEMPERATURE: 98.5 F | OXYGEN SATURATION: 95 % | BODY MASS INDEX: 23.67 KG/M2 | HEIGHT: 70 IN | HEART RATE: 63 BPM | WEIGHT: 165.34 LBS | SYSTOLIC BLOOD PRESSURE: 110 MMHG | DIASTOLIC BLOOD PRESSURE: 70 MMHG

## 2021-02-10 DIAGNOSIS — F17.210 CIGARETTE NICOTINE DEPENDENCE WITHOUT COMPLICATION: ICD-10-CM

## 2021-02-10 DIAGNOSIS — Z00.00 ENCOUNTER FOR PREVENTIVE CARE: ICD-10-CM

## 2021-02-10 DIAGNOSIS — F17.210 NICOTINE DEPENDENCE, CIGARETTES, UNCOMPLICATED: ICD-10-CM

## 2021-02-10 PROBLEM — R03.0 ELEVATED BLOOD PRESSURE READING: Status: RESOLVED | Noted: 2018-10-10 | Resolved: 2021-02-10

## 2021-02-10 PROCEDURE — 99214 OFFICE O/P EST MOD 30 MIN: CPT | Performed by: FAMILY MEDICINE

## 2021-02-10 RX ORDER — NICOTINE 21 MG/24HR
1 PATCH, TRANSDERMAL 24 HOURS TRANSDERMAL EVERY 24 HOURS
Qty: 30 PATCH | Refills: 1 | Status: SHIPPED
Start: 2021-02-10 | End: 2021-05-10

## 2021-02-10 ASSESSMENT — PATIENT HEALTH QUESTIONNAIRE - PHQ9: CLINICAL INTERPRETATION OF PHQ2 SCORE: 0

## 2021-02-10 NOTE — PROGRESS NOTES
"CC: Nicotine dependence    HISTORY OF THE PRESENT ILLNESS: Patient is a 55 y.o. male.     Patient is here to establish care today.    His primary concern is quitting smoking.  He has about a 38-pack-year history of smoking he is currently smoking 1 to 2 packs/day.  He strongly desires to quit.  In the past he did successfully quit with nicotine patches.  He did try Chantix as well, but finds it difficult to take the medicine on a daily basis due to his job.  He does not currently have any cough, shortness of breath or wheezing.    Allergies: Patient has no known allergies.    Current Outpatient Medications Ordered in Epic   Medication Sig Dispense Refill   • nicotine (NICODERM) 21 MG/24HR PATCH 24 HR Place 1 Patch on the skin every 24 hours. 30 Patch 1     No current Epic-ordered facility-administered medications on file.       Past Medical History:   Diagnosis Date   • Drug abuse (HCC)     History of IV abuse, clean since 2003       Past Surgical History:   Procedure Laterality Date   • APPENDECTOMY     • HERNIA REPAIR      bilateral inguinal    • OTHER ORTHOPEDIC SURGERY      RIght meniscus       Social History     Tobacco Use   • Smoking status: Current Every Day Smoker     Packs/day: 1.00     Years: 30.00     Pack years: 30.00   • Smokeless tobacco: Never Used   • Tobacco comment: using the patch    Substance Use Topics   • Alcohol use: No   • Drug use: No       Social History     Social History Narrative   • Not on file       Family History   Problem Relation Age of Onset   • Cancer Mother         lung cancer, smoker   • Cancer Brother         prostate, half brother       ROS:   Denies fever, chest pain, shortness of breath    Exam: /70 (BP Location: Right arm, Patient Position: Sitting, BP Cuff Size: Adult)   Pulse 63   Temp 36.9 °C (98.5 °F) (Temporal)   Ht 1.778 m (5' 10\")   Wt 75 kg (165 lb 5.5 oz)   SpO2 95%  Body mass index is 23.72 kg/m².    General: Well appearing, NAD  HEENT: Normocephalic. " Conjunctiva clear, lids without ptosis, pupils equal and reactive to light accommodation, ears normal shape and contour, canals are clear bilaterally, tympanic membranes without bulging or erythema and normal cone of light, oropharynx is without erythema, edema or exudates, poor dentition  Neck: Supple without JVD. No thyromegaly or nodules  Pulmonary: Clear to ausculation.  Normal effort. No rales, ronchi, or wheezing.  Cardiovascular: Regular rate and rhythm without murmur, rubs or gallop.   Abdomen: Soft, nontender, nondistended. Normal bowel sounds. Liver and spleen are not palpable. No rebound or guarding  Neurologic: normal gait  Lymph: No cervical, supraclavicular lymph nodes are palpable  Skin: Warm and dry.  No obvious lesions.  Musculoskeletal:  No extremity cyanosis, clubbing, or edema.  Psych: Normal mood and affect. Alert and oriented. Judgment and insight is normal.    Please note that this dictation was created using voice recognition software. I have made every reasonable attempt to correct obvious errors, but I expect that there are errors of grammar and possibly content that I did not discover before finalizing the note.      Assessment/Plan  Misael was seen today for follow-up and nicotine dependence.    Diagnoses and all orders for this visit:    Nicotine dependence, cigarettes, uncomplicated  Chronic problem for the patient.  He is strongly motivated to quit at this time.  He is amenable to referral to lung cancer screening which I think is an excellent idea for him.  I will go ahead and send in NicoDerm patches.  He is advised to give us a call when he is ready to step down to the next dosage.  -     REFERRAL TO LUNG CANCER SCREENING PROGRAM; Future  -     nicotine (NICODERM) 21 MG/24HR PATCH 24 HR; Place 1 Patch on the skin every 24 hours.    Encounter for preventive care  -     CBC WITH DIFFERENTIAL; Future  -     Comp Metabolic Panel; Future  -     Lipid Profile; Future  -     VITAMIN D,25  HYDROXY; Future  -     TSH WITH REFLEX TO FT4; Future      Follow-up in 1 year sooner if needed.    Charity Bain DO  [facility]

## 2021-02-11 ENCOUNTER — OCCUPATIONAL MEDICINE (OUTPATIENT)
Dept: OCCUPATIONAL MEDICINE | Facility: CLINIC | Age: 56
End: 2021-02-11
Payer: OTHER MISCELLANEOUS

## 2021-02-11 ENCOUNTER — TELEPHONE (OUTPATIENT)
Dept: HEMATOLOGY ONCOLOGY | Facility: MEDICAL CENTER | Age: 56
End: 2021-02-11

## 2021-02-11 VITALS
BODY MASS INDEX: 24.34 KG/M2 | SYSTOLIC BLOOD PRESSURE: 118 MMHG | OXYGEN SATURATION: 96 % | WEIGHT: 170 LBS | RESPIRATION RATE: 22 BRPM | HEART RATE: 70 BPM | HEIGHT: 70 IN | TEMPERATURE: 97.6 F | DIASTOLIC BLOOD PRESSURE: 80 MMHG

## 2021-02-11 DIAGNOSIS — S46.911D RIGHT SHOULDER STRAIN, SUBSEQUENT ENCOUNTER: ICD-10-CM

## 2021-02-11 PROCEDURE — 99213 OFFICE O/P EST LOW 20 MIN: CPT | Performed by: NURSE PRACTITIONER

## 2021-02-11 RX ORDER — DICLOFENAC SODIUM 75 MG/1
75 TABLET, DELAYED RELEASE ORAL 2 TIMES DAILY
Qty: 60 TABLET | Refills: 0 | Status: SHIPPED
Start: 2021-02-11 | End: 2021-02-25

## 2021-02-11 ASSESSMENT — ENCOUNTER SYMPTOMS
MYALGIAS: 1
CONSTITUTIONAL NEGATIVE: 1
CARDIOVASCULAR NEGATIVE: 1
SENSORY CHANGE: 0
TINGLING: 1
RESPIRATORY NEGATIVE: 1
WEAKNESS: 1
PSYCHIATRIC NEGATIVE: 1

## 2021-02-11 NOTE — LETTER
[address],   [address] [place] [address]  Phone:  [phone] - Fax:  [phone]   Occupational Health [facility] Progress Report and Disability Certification  Date of Service: 2/11/2021   No Show:  No  Date / Time of Next Visit: 2/25/2021 @ 8:45am   Claim Information   Patient Name: Misael Echevarria  Claim Number:     Employer: [employer]  Date of Injury:      Insurer / TPA: Misc Workers Comp  ID / SSN:     Occupation:   Diagnosis: The encounter diagnosis was Right shoulder strain, subsequent encounter.    Medical Information   Related to Industrial Injury? Yes    Subjective Complaints:  DOI 1/4/21. States walked into a door, slipped on wet floor. Today no improvement. He states that his pain is constant, radiates from front to back shoulder now down the arm, electrical burning sensation, constricting, zapping, and worse with trying to lift. He denies numbness, tingling, or overall weakness. He takes Tylenol and Ibuprofen which is not working. He is using ice, heat, massage, and salon pas pads with very minimal improvement. He is tolerating light duty. MRI and physical therapy referral pending. Plan of care discussed with patient.    Objective Findings: Cervical spine: No step-off or deformity, no spinous process tenderness.  Full range of motion without limitation or pain.  Right shoulder: No soft tissue swelling, ecchymosis or deformity noted.  Full range of motion without limitation or pain.  Moderate TTP over the superior trapezius.  No pain with resisted abduction.  Negative drop arm, negative empty can, negative Neer's.  Distal neurovascular intact.  Right elbow: No soft tissue swelling, ecchymosis or deformity noted.  Full range of motion without limitation or pain.  No reproducible tenderness.      Right shoulder Xray 1/4/21:   IMPRESSION:       Mild glenohumeral, mild to moderate acromioclavicular joint osteoarthritis          Calcification overlying the rotator cuff suspicious for calcific tendinopathy favored over bursitis       No radiographic evidence of acute displaced fracture   Pre-Existing Condition(s):     Assessment:   Condition Worsened    Status: Additional Care Required  Permanent Disability:No    Plan: PTDiagnosticsMedication    Diagnostics: MRI    Comments:  Follow-up in 2 weeks  Restricted duty  MRI ordered, pending scheduling   Physical therapy appointments pending scheduling  May need Orthopedics referral, pending MRI results   Take Diclofenac as prescribed   Recommend continue OTC Tylenol/Ibuprofen,   ice/heat application, OTC topical ointments ie Tiger Balm, Icy-Hot, Voltaren Gel, and gentle range of motion and stretching exercises as tolerated     Disability Information   Status: Released to Restricted Duty    From:  2/11/2021  Through: 2/25/2021 Restrictions are: Temporary   Physical Restrictions   Sitting:    Standing:    Stooping:    Bending:      Squatting:    Walking:    Climbing:    Pushing:  < or = to 4 hrs/day   Pulling:  < or = to 4 hrs/day Other:    Reaching Above Shoulder (L):   Reaching Above Shoulder (R): < or = 6 hrs/day     Reaching Below Shoulder (L):    Reaching Below Shoulder (R):  < or = to 6 hrs/day   Not to exceed Weight Limits   Carrying(hrs):   Weight Limit(lb): < or = to 25 pounds Lifting(hrs):   Weight  Limit(lb): < or = to 25 pounds   Comments:      Repetitive Actions   Hands: i.e. Fine Manipulations from Grasping:     Feet: i.e. Operating Foot Controls:     Driving / Operate Machinery:     Provider Name:   ZACHARIAH Werner Physician Signature:  Physician Name:     Clinic Name / Location: [address] [address] Clinic Phone Number: Dept: [phone]   Appointment Time: 9:00 Am Visit Start Time: 8:59 AM   Check-In Time:  8:55 Am Visit Discharge Time:  9:30am   Original-Treating Physician or Chiropractor    Page 2-Insurer/TPA     Page 3-Employer    Page 4-Employee

## 2021-02-11 NOTE — PROGRESS NOTES
"Subjective:     Misael Echevarria is a 55 y.o. male who presents for Follow-Up (WC DOI 1/4/21 Rt elbo/shoulder, neck, back, worse, rm 16)      DOI 1/4/21. States walked into a door, slipped on wet floor. Today no improvement. He states that his pain is constant, radiates from front to back shoulder now down the arm, electrical burning sensation, constricting, zapping, and worse with trying to lift. He denies numbness, tingling, or overall weakness. He takes Tylenol and Ibuprofen which is not working. He is using ice, heat, massage, and salon pas pads with very minimal improvement. He is tolerating light duty. MRI and physical therapy referral pending. Plan of care discussed with patient.     Review of Systems   Constitutional: Negative.    Respiratory: Negative.    Cardiovascular: Negative.    Musculoskeletal: Positive for joint pain and myalgias.   Skin: Negative.    Neurological: Positive for tingling and weakness. Negative for sensory change.   Psychiatric/Behavioral: Negative.        SOCHX: Works as  at [employer]  FH: No pertinent family history to this problem       Objective:     /80   Pulse 70   Temp 36.4 °C (97.6 °F)   Resp (!) 22   Ht 1.778 m (5' 10\")   Wt 77.1 kg (170 lb)   SpO2 96%   BMI 24.39 kg/m²     Constitutional: Patient is in no acute distress. Appears well-developed and well-nourished.   Cardiovascular: Normal rate.    Pulmonary/Chest: Effort normal. No respiratory distress.   Neurological: Patient is alert and oriented to person, place, and time.   Skin: Skin is warm and dry.   Psychiatric: Normal mood and affect. Behavior is normal.     Cervical spine: No step-off or deformity, no spinous process tenderness.  Full range of motion without limitation or pain.  Right shoulder: No soft tissue swelling, ecchymosis or deformity noted.  Full range of motion without limitation or pain.  Moderate TTP over the superior trapezius.  No pain with resisted " abduction.  Negative drop arm, negative empty can, negative Neer's.  Distal neurovascular intact.  Right elbow: No soft tissue swelling, ecchymosis or deformity noted.  Full range of motion without limitation or pain.  No reproducible tenderness.      Right shoulder Xray 1/4/21:   IMPRESSION:       Mild glenohumeral, mild to moderate acromioclavicular joint osteoarthritis       Calcification overlying the rotator cuff suspicious for calcific tendinopathy favored over bursitis       No radiographic evidence of acute displaced fracture    Assessment/Plan:       1. Right shoulder strain, subsequent encounter  - diclofenac DR (VOLTAREN) 75 MG Tablet Delayed Response; Take 1 tablet by mouth 2 times a day.  Dispense: 60 tablet; Refill: 0    Released to Restricted Duty FROM 2/11/2021 TO 2/25/2021       Follow-up in 2 weeks  Restricted duty  MRI ordered, pending scheduling   Physical therapy appointments pending scheduling  May need Orthopedics referral, pending MRI results   Take Diclofenac as prescribed   Recommend continue OTC Tylenol/Ibuprofen,   ice/heat application, OTC topical ointments ie Tiger Balm, Icy-Hot, Voltaren Gel, and gentle range of motion and stretching exercises as tolerated     Differential diagnosis, natural history, supportive care, and indications for immediate follow-up discussed.    Approximately 25 minutes was spent in preparing for visit, obtaining history, exam and evaluation, patient counseling/education and post visit documentation/orders.

## 2021-02-11 NOTE — TELEPHONE ENCOUNTER
Received referral to lung cancer screening program.  Chart review to assess for lung cancer screening program eligibility.   1. Age 55-77 yrs of age? Yes 55 y.o.  2. 30 pack year hx of smoking, or greater? Yes 1.5 [id number]= [id number] hx  3. Current smoker or if quit, has pt quit within last 15 yrs?Yes  Current smoker  4. Any signs or symptoms of lung cancer? None noted  5. Previous history of lung cancer? None noted  6. Chest CT within past 12 mos.? None noted  Patient does meet eligibility criteria. [facility] scheduling notified to schedule the shared decision making visit.

## 2021-02-18 ENCOUNTER — PHYSICAL THERAPY (OUTPATIENT)
Dept: PHYSICAL THERAPY | Facility: REHABILITATION | Age: 56
End: 2021-02-18
Attending: NURSE PRACTITIONER
Payer: OTHER MISCELLANEOUS

## 2021-02-18 DIAGNOSIS — S46.911D RIGHT SHOULDER STRAIN, SUBSEQUENT ENCOUNTER: ICD-10-CM

## 2021-02-18 PROCEDURE — 97140 MANUAL THERAPY 1/> REGIONS: CPT

## 2021-02-18 PROCEDURE — 97014 ELECTRIC STIMULATION THERAPY: CPT

## 2021-02-18 PROCEDURE — 97161 PT EVAL LOW COMPLEX 20 MIN: CPT

## 2021-02-18 ASSESSMENT — ENCOUNTER SYMPTOMS
PAIN SCALE AT HIGHEST: 10
PAIN SCALE AT LOWEST: 5
PAIN SCALE: 8

## 2021-02-18 NOTE — OP THERAPY EVALUATION
"  Outpatient Physical Therapy  INITIAL EVALUATION    [facility] Physical [address].  [address]  [facility] [address]  Phone:  [phone]  Fax:  [phone]    Date of Evaluation: 2021    Patient: Misael Echevarria  YOB: 1965  MRN: [id number]     Referring Provider: ZACHARIAH Werner  [address] [place] [id number]   Referring Diagnosis Right shoulder strain, subsequent encounter [S46.911D]     Time Calculation    Start time: 724  Stop time: 815 Time Calculation (min): 51 minutes             Chief Complaint: No chief complaint on file.    Visit Diagnoses     ICD-10-CM   1. Right shoulder strain, subsequent encounter  S46.911D       No data found  Subjective   History of Present Illness:     History of chief complaint:  Slipped and fell on a wet floor about two months ago.  Patient reported immediate pain and noticed swelling but no bruising over the next few days.  Patient reported, initially,  that his wrist hurt more than his elbow or shoulder but now his shoulder causes the most pain and is unchanged since the injury.    Prior level of function:       Pain:     Current pain ratin    At best pain ratin    At worst pain rating:  10    Location:  R scapula,lateral deltoid and , biceps ache to elbow \"cramp/ache\"--patient reports n/t to mid forearm    Aggravating factors:  Sleep up 4+ night  Wash hands increase pain,   Bathing, unable to wash his back  Reach or raise arm in any way  Pain with twisting and openning a door  Unable to prepare food due to pain with cutting and chopping  Patient reports increased weakness and  pain with supination and is unable to bend his elbow in that position        Past Medical History:   Diagnosis Date   • Drug abuse (HCC)     History of IV abuse, clean since      Past Surgical History:   Procedure Laterality Date   • APPENDECTOMY     • HERNIA REPAIR      bilateral inguinal    • OTHER ORTHOPEDIC " "SURGERY      RIght meniscus       Precautions:       Objective   Observation and functional movement:  Prominent R ACJ due probably to  atrophy R post/lateral deltoid and supinatus as compared to L, Increased scapular tipping/winging R>L    Range of motion and strength:    R shoulder AROM  Fle: 85  abd 45-erp  Er: 20--erp  hbb greater trochanter--severe ERP  severe pain with set-up for all resisted cuff test--all tests limited due to severe pain and weakness    Palpation and joint mobility:     Exquisite palpatory tenderness to lateral ACJ, conoid ligament,   TTP with noted trigger pt. Tenderness to anterior deltoid,  Infraspinatus and to a lesser extent supraspinatus    PROM: unable to assess due to pain--patient reported relief with caudal mobs gd 1 to distal clavical and gd 1 GHJ approximation in scaption            Therapeutic Treatments and Modalities:     Therapeutic Treatment and Modalities Summary: gd 1 mobs to GHJ in scaption//patient was able to relax and reported temporary abolition of pain  gd 1-2 caudal mobs ACJ //\" feels good  Attempted PROM assessment but was unable due to pain  instructed in self AAROM for flexion in supine--unable due to pain  Instructed pendulums  Mexican 5/5 to deltoid, infra, supra with mhp  GHJ instability taping with rock tape  E-stim and heat felt great\"  Fle: 90    Time-based treatments/modalities:    Physical Therapy Timed Treatment Charges  Manual therapy minutes (CPT 39387): 15 minutes  Therapeutic exercise minutes (CPT 71716): 5 minutes      Assessment, Response and Plan:   Assessment details:  Patient presents with R  RTC strain/ sprain due to slip-fall injury with out-stretched arm.--unable to complete palpatory exam or resisted tests due to severe pain.  Noted (+) apprehension /relocation test with patient reporting relief with GHJ approximation in scaption . Suspect RTC involvement and recommend an MRI ASAP.  Recommend physical therapy for pain management and to " normalize motion and educate patient on self care, behavior modification to manage injury while waiting for diagnostics  Prognosis: fair    Goals:   Short Term Goals:   Sleep through night  Wash hands w/o increased pain,   Bathing bathe without limit  Wash hair   open a door w/o pain  prepare food including  cutting and chopping  W/o pain  DASH  Short term goal time span:  2-4 weeks  Long term goal time span:  6-8 weeks    Plan:   Therapy options:  Physical therapy treatment to continue  Planned therapy interventions:  E Stim Unattended (CPT 76528), Manual Therapy (CPT 72015) and Therapeutic Exercise (CPT 59019)  Other planned therapy interventions:  Dry needle  Frequency:  2x week  Duration in weeks:  6  Duration in visits:  6  Plan details:  IASTM, prom, scapular stability, MFR, DN and HEP to preserve ROM.      Functional Assessment Used  Quickdash General Total Score: 68.18     Referring provider co-signature:  I have reviewed this plan of care and my co-signature certifies the need for services.    Certification Period: 02/18/2021 to  04/22/21    Physician Signature: ________________________________ Date: ______________

## 2021-02-23 ENCOUNTER — PHYSICAL THERAPY (OUTPATIENT)
Dept: PHYSICAL THERAPY | Facility: REHABILITATION | Age: 56
End: 2021-02-23
Attending: NURSE PRACTITIONER
Payer: OTHER MISCELLANEOUS

## 2021-02-23 DIAGNOSIS — S46.911D RIGHT SHOULDER STRAIN, SUBSEQUENT ENCOUNTER: ICD-10-CM

## 2021-02-23 PROCEDURE — 97110 THERAPEUTIC EXERCISES: CPT

## 2021-02-23 PROCEDURE — 97014 ELECTRIC STIMULATION THERAPY: CPT

## 2021-02-23 PROCEDURE — 97140 MANUAL THERAPY 1/> REGIONS: CPT

## 2021-02-23 NOTE — OP THERAPY DAILY TREATMENT
"  Outpatient Physical Therapy  DAILY TREATMENT     [facility] Physical Therapy [address].  [address]  [place] [address]  Phone:  [phone]  Fax:  [phone]    Date: 02/23/2021    Patient: Misael Echevarria  YOB: 1965  MRN: [id number]     Time Calculation    Start time: 0716  Stop time: 0805 Time Calculation (min): 49 minutes         Chief Complaint: No chief complaint on file.    Visit #: 2    SUBJECTIVE:  Unchanged, but a good night of sleep last night..Tape seemed to help still pain with any movement    OBJECTIVE:  R ghj AROM  Fle: 35 erp  abd 20 erp er 20 erp          Therapeutic Treatments and Modalities:     Therapeutic Treatment and Modalities Summary: Reviewed pendulums  Pulley aarom  Seated pendulums--hep  S/l ball roll--hep\" least painful of all ex\"--self rom to 100 deg flex  PROM all plane --caudal mobs gd 1-2//\" no pain...stay just like this\"  S/l ball roll with Chinese to infra/deltoids 10/10 x 15' w/ mhp    REviewed HEP and techniques for positioning and bathing to reduce pain  instructed the importance of movement w/;o pain    Time-based treatments/modalities:    Physical Therapy Timed Treatment Charges  Manual therapy minutes (CPT 40141): 5 minutes  Therapeutic exercise minutes (CPT 59066): 20 minutes      Pain rating (1-10) before treatment:  5  Pain rating (1-10) after treatment:  3---good lying down but wne to get up,. Jerked his shoulder 7-8/10--isntructed patient to   An envelope sling and wear for comfort while doing ROM ex houlry  ##instucted patient to contact [facility] and arrange a time to get his MRI completed    ASSESSMENT:   Cont. To suspect RTC tear and possible SLAP tear.  Patient was able to demonstrate increased rom with certain ex. w/ decreased pain    PLAN/RECOMMENDATIONS:   scap stab, mobs for pain relief and to progress rom as tolerated       "

## 2021-02-25 ENCOUNTER — OCCUPATIONAL MEDICINE (OUTPATIENT)
Dept: OCCUPATIONAL MEDICINE | Facility: CLINIC | Age: 56
End: 2021-02-25
Payer: OTHER MISCELLANEOUS

## 2021-02-25 ENCOUNTER — TELEPHONE (OUTPATIENT)
Dept: HEMATOLOGY ONCOLOGY | Facility: MEDICAL CENTER | Age: 56
End: 2021-02-25

## 2021-02-25 ENCOUNTER — PHYSICAL THERAPY (OUTPATIENT)
Dept: PHYSICAL THERAPY | Facility: REHABILITATION | Age: 56
End: 2021-02-25
Attending: NURSE PRACTITIONER
Payer: OTHER MISCELLANEOUS

## 2021-02-25 VITALS
HEIGHT: 70 IN | SYSTOLIC BLOOD PRESSURE: 132 MMHG | DIASTOLIC BLOOD PRESSURE: 82 MMHG | OXYGEN SATURATION: 96 % | WEIGHT: 166 LBS | HEART RATE: 105 BPM | RESPIRATION RATE: 20 BRPM | BODY MASS INDEX: 23.77 KG/M2

## 2021-02-25 DIAGNOSIS — S46.911D RIGHT SHOULDER STRAIN, SUBSEQUENT ENCOUNTER: ICD-10-CM

## 2021-02-25 PROCEDURE — 97014 ELECTRIC STIMULATION THERAPY: CPT

## 2021-02-25 PROCEDURE — 99212 OFFICE O/P EST SF 10 MIN: CPT | Performed by: NURSE PRACTITIONER

## 2021-02-25 PROCEDURE — 97140 MANUAL THERAPY 1/> REGIONS: CPT

## 2021-02-25 RX ORDER — MELOXICAM 7.5 MG/1
15 TABLET ORAL DAILY
Qty: 30 TABLET | Refills: 0 | Status: SHIPPED | OUTPATIENT
Start: 2021-02-25 | End: 2021-03-03 | Stop reason: SDUPTHER

## 2021-02-25 ASSESSMENT — ENCOUNTER SYMPTOMS
TINGLING: 1
MYALGIAS: 1
CONSTITUTIONAL NEGATIVE: 1
PSYCHIATRIC NEGATIVE: 1
WEAKNESS: 1
RESPIRATORY NEGATIVE: 1
SENSORY CHANGE: 1
CARDIOVASCULAR NEGATIVE: 1

## 2021-02-25 NOTE — LETTER
[address],   [address] [place] [address]  Phone:  [phone] - Fax:  [phone]   Occupational Health [facility] Progress Report and Disability Certification  Date of Service: 2/25/2021   No Show:  No  Date / Time of Next Visit: 3/4/2021 @ 8:45am   Claim Information   Patient Name: Misael Echevarria  Claim Number:     Employer: [employer]  Date of Injury: 1/4/2021     Insurer / TPA: Misc Workers Comp  ID / SSN:     Occupation:   Diagnosis: The encounter diagnosis was Right shoulder strain, subsequent encounter.    Medical Information   Related to Industrial Injury? Yes    Subjective Complaints:  DOI 1/4/21. States walked into a door, slipped on wet floor. Today no improvement, feels worse. He states that his pain is constant, radiates from front to back shoulder now down the arm, electrical burning sensation, constricting, zapping, and worse with trying to lift. He denies numbness, tingling, or overall weakness. He states that the Diclofenac was not helpful. He has been attending physical therapy with no improvement. He states that he is tolerating light duty the best he can. He is scheduled for his MRI 2/27/21. Placed Orthopedics referral at this visit for further follow-up and management.    Objective Findings: Cervical spine: No step-off or deformity, no spinous process tenderness.  Full range of motion without limitation or pain.  Right shoulder: No soft tissue swelling, ecchymosis or deformity noted.  Full range of motion without limitation or pain.  Moderate to severe TTP over the superior trapezius.  No pain with resisted abduction.  Negative drop arm, negative empty can, negative Neer's.  Distal neurovascular intact.  Right elbow: No soft tissue swelling, ecchymosis or deformity noted.  Full range of motion without limitation or pain.  Some reproducible tenderness.      Right shoulder Xray 1/4/21:   IMPRESSION:          Mild glenohumeral, mild to moderate acromioclavicular joint osteoarthritis       Calcification overlying the rotator cuff suspicious for calcific tendinopathy favored over bursitis       No radiographic evidence of acute displaced fracture   Pre-Existing Condition(s):     Assessment:   Condition Worsened    Status: Discharged / Care Transfer  Permanent Disability:No    Plan: MedicationPTDiagnosticsTransfer Care    Diagnostics: MRI    Comments:  Follow-up in 1 week  Restricted duty  MRI scheduled for 2/27/21  Physical therapy appointments pending scheduling  Orthopedics referral placed   Take Meloxicam as prescribed   Recommend continue OTC Tylenol/Ibuprofen, ice/heat application, OTC topica  l ointments ie Tiger Balm, Icy-Hot, Voltaren Gel, and gentle range of motion and stretching exercises as tolerated   Recommend arm sling as needed for comfort    Disability Information   Status: Released to Restricted Duty    From:  2/25/2021  Through: 3/4/2021 Restrictions are: Temporary   Physical Restrictions   Sitting:    Standing:    Stooping:    Bending:      Squatting:    Walking:    Climbing:    Pushing:  < or = to 4 hrs/day   Pulling:  < or = to 4 hrs/day Other:    Reaching Above Shoulder (L):   Reaching Above Shoulder (R): < or = 4 hrs/day     Reaching Below Shoulder (L):    Reaching Below Shoulder (R):  < or = to 4 hrs/day   Not to exceed Weight Limits   Carrying(hrs):   Weight Limit(lb): < or = to 25 pounds Lifting(hrs):   Weight  Limit(lb): < or = to 25 pounds   Comments:      Repetitive Actions   Hands: i.e. Fine Manipulations from Grasping:     Feet: i.e. Operating Foot Controls:     Driving / Operate Machinery:     Provider Name:   ZACHARIAH Werner Physician Signature:  Physician Name:     Clinic Name / Location: [address],   [address]  [place] [address] Clinic Phone Number: Dept: [phone]   Appointment Time: 8:45 Am Visit Start Time: 9:03 AM   Check-In Time:   8:44 Am Visit Discharge Time:  9:28am   Original-Treating Physician or Chiropractor    Page 2-Insurer/TPA    Page 3-Employer    Page 4-Employee

## 2021-02-25 NOTE — PROGRESS NOTES
"Subjective:     Misael Echevarria is a 55 y.o. male who presents for Follow-Up (WC DOI 1/4/21 Rt elbow/shoulder, neck, back, worse, rm 16)      DOI 1/4/21. States walked into a door, slipped on wet floor. Today no improvement, feels worse. He states that his pain is constant, radiates from front to back shoulder now down the arm, electrical burning sensation, constricting, zapping, and worse with trying to lift. He denies numbness, tingling, or overall weakness. He states that the Diclofenac was not helpful. He has been attending physical therapy with no improvement. He states that he is tolerating light duty the best he can. He is scheduled for his MRI 2/27/21. Placed Orthopedics referral at this visit for further follow-up and management.     Review of Systems   Constitutional: Negative.    Respiratory: Negative.    Cardiovascular: Negative.    Musculoskeletal: Positive for joint pain and myalgias.   Skin: Negative.    Neurological: Positive for tingling, sensory change and weakness.   Psychiatric/Behavioral: Negative.        SOCHX: Works as  at [employer]  FH: No pertinent family history to this problem     Objective:     /82   Pulse (!) 105   Resp 20   Ht 1.778 m (5' 10\")   Wt 75.3 kg (166 lb)   SpO2 96%   BMI 23.82 kg/m²     Constitutional: Patient is in no acute distress. Appears well-developed and well-nourished.   Cardiovascular: Normal rate.    Pulmonary/Chest: Effort normal. No respiratory distress.   Neurological: Patient is alert and oriented to person, place, and time.   Skin: Skin is warm and dry.   Psychiatric: Normal mood and affect. Behavior is normal.     Cervical spine: No step-off or deformity, no spinous process tenderness.  Full range of motion without limitation or pain.  Right shoulder: No soft tissue swelling, ecchymosis or deformity noted.  Full range of motion without limitation or pain.  Moderate to severe TTP over the superior trapezius. "  No pain with resisted abduction.  Negative drop arm, negative empty can, negative Neer's.  Distal neurovascular intact.  Right elbow: No soft tissue swelling, ecchymosis or deformity noted.  Full range of motion without limitation or pain.  Some reproducible tenderness.      Right shoulder Xray 1/4/21:   IMPRESSION:       Mild glenohumeral, mild to moderate acromioclavicular joint osteoarthritis       Calcification overlying the rotator cuff suspicious for calcific tendinopathy favored over bursitis       No radiographic evidence of acute displaced fracture    Assessment/Plan:       1. Right shoulder strain, subsequent encounter  - meloxicam (MOBIC) 7.5 MG Tab; Take 2 Tablets by mouth every day for 15 days.  Dispense: 30 tablet; Refill: 0    Released to Restricted Duty FROM 2/25/2021 TO 3/4/2021     Follow-up in 1 week  Restricted duty  MRI scheduled for 2/27/21  Physical therapy appointments pending scheduling  Orthopedics referral placed   Take Meloxicam as prescribed   Recommend continue OTC Tylenol/Ibuprofen, ice/heat application, OTC topica  l ointments ie Tiger Balm, Icy-Hot, Voltaren Gel, and gentle range of motion and stretching exercises as tolerated   Recommend arm sling as needed for comfort    Differential diagnosis, natural history, supportive care, and indications for immediate follow-up discussed.    Approximately 15 minutes was spent in preparing for visit, obtaining history, exam and evaluation, patient counseling/education and post visit documentation/orders.

## 2021-02-25 NOTE — OP THERAPY DAILY TREATMENT
"  Outpatient Physical Therapy  DAILY TREATMENT     [facility] Physical Therapy [address].  [address]  [place] [address]  Phone:  [phone]  Fax:  [phone]    Date: 02/25/2021    Patient: Misael Echevarria  YOB: 1965  MRN: [id number]     Time Calculation    Start time: 1110  Stop time: 1150 Time Calculation (min): 40 minutes         Chief Complaint: No chief complaint on file.    Visit #: 3    SUBJECTIVE:  Better for a while but flared up due to long shift w/ the patient stating that he has 5 more hours today  Patient o get MRI this Saturday  OBJECTIVE:  R ghj AROM  Fle: 25 erp  abd 20 erp er 20 erp          Therapeutic Treatments and Modalities:     Therapeutic Treatment and Modalities Summary: Reviewed pendulums  MFR R scap  stm R upper trap  Caudal mobs with distraction gd 1//\" feels great, no pain  [place] to infra/upper trap 5/5 mhp x15' mhp  kinesio tape for ghj instability    Time-based treatments/modalities:    Physical Therapy Timed Treatment Charges  Manual therapy minutes (CPT 74702): 25 minutes      Pain rating (1-10) before treatment:  8 \" it's killing me  Pain rating (1-10) after treatment: 1/10 \" right now, almost no pain with the ASSESSMENT:   Cont. To suspect RTC tear and possible SLAP tear.  Severe pain today  That we able to temporarily resolve with stm, jt mobs, e-stim and supportive kinesio taping--patient started ot present with neck spasm due to scapular guarding  PLAN/RECOMMENDATIONS:     Patient to get MRI this weekend  scap stab, mobs for pain relief and to progress rom as tolerated       "

## 2021-02-26 NOTE — TELEPHONE ENCOUNTER
Called patient in regards to missed appt on 02/25/2021. Left vm for patient to call back to reschedule appt.

## 2021-02-27 ENCOUNTER — HOSPITAL ENCOUNTER (OUTPATIENT)
Dept: RADIOLOGY | Facility: MEDICAL CENTER | Age: 56
End: 2021-02-27
Attending: NURSE PRACTITIONER
Payer: OTHER MISCELLANEOUS

## 2021-02-27 DIAGNOSIS — S46.911D RIGHT SHOULDER STRAIN, SUBSEQUENT ENCOUNTER: ICD-10-CM

## 2021-02-27 PROCEDURE — 73221 MRI JOINT UPR EXTREM W/O DYE: CPT | Mod: RT

## 2021-03-02 ENCOUNTER — PHYSICAL THERAPY (OUTPATIENT)
Dept: PHYSICAL THERAPY | Facility: REHABILITATION | Age: 56
End: 2021-03-02
Attending: NURSE PRACTITIONER
Payer: OTHER MISCELLANEOUS

## 2021-03-02 DIAGNOSIS — S46.911D RIGHT SHOULDER STRAIN, SUBSEQUENT ENCOUNTER: ICD-10-CM

## 2021-03-02 PROCEDURE — 97110 THERAPEUTIC EXERCISES: CPT

## 2021-03-02 PROCEDURE — 97140 MANUAL THERAPY 1/> REGIONS: CPT

## 2021-03-02 PROCEDURE — 97014 ELECTRIC STIMULATION THERAPY: CPT

## 2021-03-02 NOTE — OP THERAPY DAILY TREATMENT
"  Outpatient Physical Therapy  DAILY TREATMENT     [facility] Physical [address]  [address]  [place] [address]  Phone:  [phone]  Fax:  [phone]    Date: 03/02/2021    Patient: Misael Echevarria  YOB: 1965  MRN: [id number]     Time Calculation    Start time: 1120  Stop time: 1200 Time Calculation (min): 40 minutes         Chief Complaint: No chief complaint on file.    Visit #: 4    SUBJECTIVE:  Training someone and have not used in 4 days--no pain right now.  Tape felt good while it was   Patient o get MRI this Saturday  OBJECTIVE:  R ghj AROM  Fle: 50 erp  abd 40 erp er 30 erp          Therapeutic Treatments and Modalities:     Therapeutic Treatment and Modalities Summary: Supine self assist arom for GH flexion  PROM for fle/abd /er: fle 130, abd 100 er 40  Attempted s/l ball roll // patient had to stop due to excruciating pain\" decreased pain with mobs and prom  Caudal mobs at end range fle/abd er  [place] to infra/upper trap 5/5 mhp x15' mhp  kinesio tape for ghj instability    Time-based treatments/modalities:    Physical Therapy Timed Treatment Charges  Manual therapy minutes (CPT 68288): 15 minutes  Therapeutic exercise minutes (CPT 22044): 8 minutes      Pain rating (1-10) before treatment:  0 no pain  Pain rating (1-10) after treatment: 1/10 \" right now, almost no pain with the tape\"  ASSESSMENT:   Cont. To suspect RTC tear and possible SLAP tear.  Unable to tolerated gentle arom out of gravity ex. w/o severe pain.  Continue to suspect shoulder instability limiting tolerance to any significant arom w/o severe pain.  PLAN/RECOMMENDATIONS:   Recommend patient see an orthopedic specialist to establish pain of care  scap stab, mobs for pain relief and to progress rom as tolerated       "

## 2021-03-03 ENCOUNTER — OCCUPATIONAL MEDICINE (OUTPATIENT)
Dept: OCCUPATIONAL MEDICINE | Facility: CLINIC | Age: 56
End: 2021-03-03
Payer: OTHER MISCELLANEOUS

## 2021-03-03 VITALS
HEART RATE: 60 BPM | DIASTOLIC BLOOD PRESSURE: 78 MMHG | TEMPERATURE: 97.6 F | OXYGEN SATURATION: 97 % | HEIGHT: 70 IN | SYSTOLIC BLOOD PRESSURE: 114 MMHG | WEIGHT: 160 LBS | BODY MASS INDEX: 22.9 KG/M2

## 2021-03-03 DIAGNOSIS — S46.911D RIGHT SHOULDER STRAIN, SUBSEQUENT ENCOUNTER: ICD-10-CM

## 2021-03-03 PROCEDURE — 99213 OFFICE O/P EST LOW 20 MIN: CPT | Performed by: NURSE PRACTITIONER

## 2021-03-03 RX ORDER — MELOXICAM 7.5 MG/1
15 TABLET ORAL DAILY
Qty: 30 TABLET | Refills: 0 | Status: SHIPPED
Start: 2021-03-03 | End: 2021-03-18

## 2021-03-03 ASSESSMENT — ENCOUNTER SYMPTOMS
CONSTITUTIONAL NEGATIVE: 1
RESPIRATORY NEGATIVE: 1
WEAKNESS: 1
PSYCHIATRIC NEGATIVE: 1
CARDIOVASCULAR NEGATIVE: 1
TINGLING: 0
MYALGIAS: 1
SENSORY CHANGE: 0

## 2021-03-03 NOTE — PROGRESS NOTES
"Subjective:     Misael Echevarria is a 55 y.o. male who presents for Other (WC DOI 1/4/21 rt arm, feeling worse, room 17)      DOI 1/4/21. States walked into a door, slipped on wet floor. Today feels worse. He states that his pain is constant, radiates from front to back shoulder now down the arm, electrical burning sensation, constricting, zapping, and worse with trying to lift. He denies numbness, tingling, or overall weakness. He states that the Meloxicam was slightly helpful, but he is not taking the full dose. He has been attending physical therapy with no improvement. He states that he is tolerating light duty the best he can. MRI results reviewed with patient. Orthopedics referral authorized, he is planning on scheduling an appointment for further follow-up and management. Plan of care discussed with patient.     Review of Systems   Constitutional: Negative.    Respiratory: Negative.    Cardiovascular: Negative.    Musculoskeletal: Positive for joint pain and myalgias.   Skin: Negative.    Neurological: Positive for weakness. Negative for tingling and sensory change.   Psychiatric/Behavioral: Negative.        SOCHX: Works as  at [employer]  FH: No pertinent family history to this problem       Objective:     /78   Pulse 60   Temp 36.4 °C (97.6 °F)   Ht 1.778 m (5' 10\")   Wt 72.6 kg (160 lb)   SpO2 97%   BMI 22.96 kg/m²     Constitutional: Patient is in no acute distress. Appears well-developed and well-nourished.   Cardiovascular: Normal rate.    Pulmonary/Chest: Effort normal. No respiratory distress.   Neurological: Patient is alert and oriented to person, place, and time.   Skin: Skin is warm and dry.   Psychiatric: Normal mood and affect. Behavior is normal.     or deformity noted.  Full range of motion without limitation or pain.  Moderate to severe TTP over the superior trapezius.  No pain with resisted abduction.  Negative drop arm, negative empty can, " negative Neer's.  Distal neurovascular intact.  Right elbow: No soft tissue swelling, ecchymosis or deformity noted.  Full range of motion without limitation or pain.  Some reproducible tenderness    MRI right shoulder 2/27/21:   IMPRESSION:     1.  Tendinopathy involving the supraspinatus, infraspinatus and subscapularis tendons with fraying of the bursal and articular surface fibers. No evidence of full-thickness tear or retraction.     2.  Mild degenerative tearing of the posterior/superior glenoid labrum. The biceps anchor is intact.     3.  Degenerative change of the acromioclavicular joint with large inferiorly directed osteophytes.     4.   Mild degenerative tearing of the posterior/inferior glenoid labrum.    Assessment/Plan:       1. Right shoulder strain, subsequent encounter  - meloxicam (MOBIC) 7.5 MG Tab; Take 2 Tablets by mouth every day for 15 days.  Dispense: 30 tablet; Refill: 0    Released to Restricted Duty FROM 3/3/2021 TO         Follow-up in 3 weeks, if not seen by Orthopedics  Restricted duty, per Orthopedics  Physical therapy appointments pending scheduling  Take Meloxicam as prescribed   Recommend continue OTC Tylenol/Ibuprofen, ice/heat application, OTC topical ointments   ie Tiger Balm, Icy-Hot, Voltaren Gel, and gentle range of motion and stretching exercises as tolerated   Recommend arm sling as needed for comfort    Differential diagnosis, natural history, supportive care, and indications for immediate follow-up discussed.    Approximately 25 minutes was spent in preparing for visit, obtaining history, exam and evaluation, patient counseling/education and post visit documentation/orders.

## 2021-03-03 NOTE — LETTER
[address],   [address] [place] [address]  Phone:  [phone] - Fax:  [phone]   Occupational Health [facility] Progress Report and Disability Certification  Date of Service: 3/3/2021   No Show:  No  Date / Time of Next Visit:   Discharged / Care Transfer to Orthopedics   Claim Information   Patient Name: Misael Echevarria  Claim Number:     Employer: [employer]  Date of Injury: 1/4/2021     Insurer / TPA: [employer]  ID / SSN:     Occupation:   Diagnosis: The encounter diagnosis was Right shoulder strain, subsequent encounter.    Medical Information   Related to Industrial Injury? Yes    Subjective Complaints:  DOI 1/4/21. States walked into a door, slipped on wet floor. Today feels worse. He states that his pain is constant, radiates from front to back shoulder now down the arm, electrical burning sensation, constricting, zapping, and worse with trying to lift. He denies numbness, tingling, or overall weakness. He states that the Meloxicam was slightly helpful, but he is not taking the full dose. He has been attending physical therapy with no improvement. He states that he is tolerating light duty the best he can. MRI results reviewed with patient. Orthopedics referral authorized, he is planning on scheduling an appointment for further follow-up and management. Plan of care discussed with patient.    Objective Findings: or deformity noted.  Full range of motion without limitation or pain.  Moderate to severe TTP over the superior trapezius.  No pain with resisted abduction.  Negative drop arm, negative empty can, negative Neer's.  Distal neurovascular intact.  Right elbow: No soft tissue swelling, ecchymosis or deformity noted.  Full range of motion without limitation or pain.  Some reproducible tenderness    MRI right shoulder 2/27/21:   IMPRESSION:     1.  Tendinopathy involving the supraspinatus, infraspinatus and  subscapularis tendons with fraying of the bursal and articular surface fibers. No evidence of full-thickness tear or retraction.     2.  Mild degenerative tearing of the posterior/superior glenoid labrum. The biceps anchor is intact.     3.  Degenerative change of the acromioclavicular joint with large inferiorly directed osteophytes.     4.   Mild degenerative tearing of the posterior/inferior glenoid labrum.   Pre-Existing Condition(s):     Assessment:   Condition Same    Status: Discharged / Care Transfer  Permanent Disability:No    Plan: PTTransfer CareMedication    Diagnostics:      Comments:  Follow-up in 3 weeks, if not seen by Orthopedics  Restricted duty, per Orthopedics  Physical therapy appointments pending scheduling  Take Meloxicam as prescribed   Recommend continue OTC Tylenol/Ibuprofen, ice/heat application, OTC topical ointments   ie Tiger Balm, Icy-Hot, Voltaren Gel, and gentle range of motion and stretching exercises as tolerated   Recommend arm sling as needed for comfort    Disability Information   Status: Released to Restricted Duty    From:  3/3/2021  Through:   Restrictions are: Temporary   Physical Restrictions   Sitting:    Standing:    Stooping:    Bending:      Squatting:    Walking:    Climbing:    Pushing:  < or = to 2 hrs/day   Pulling:  < or = to 2 hrs/day Other:    Reaching Above Shoulder (L):   Reaching Above Shoulder (R): < or = 1 hrs/day     Reaching Below Shoulder (L):    Reaching Below Shoulder (R):  < or = to 1 hrs/day   Not to exceed Weight Limits   Carrying(hrs):   Weight Limit(lb): < or = to 10 pounds Lifting(hrs):   Weight  Limit(lb): < or = to 10 pounds   Comments:      Repetitive Actions   Hands: i.e. Fine Manipulations from Grasping:     Feet: i.e. Operating Foot Controls:     Driving / Operate Machinery:     Provider Name:   ZACHARIAH Werner Physician Signature:  Physician Name:     Clinic Name / Location: [address]  [place] [address] Clinic Phone Number: Dept: [phone]   Appointment Time: 10:45 Am Visit Start Time: 9:02 AM   Check-In Time:  8:49 Am Visit Discharge Time: 9:30 AM    Original-Treating Physician or Chiropractor    Page 2-Insurer/TPA    Page 3-Employer    Page 4-Employee

## 2021-03-09 ENCOUNTER — PHYSICAL THERAPY (OUTPATIENT)
Dept: PHYSICAL THERAPY | Facility: REHABILITATION | Age: 56
End: 2021-03-09
Attending: NURSE PRACTITIONER
Payer: OTHER MISCELLANEOUS

## 2021-03-09 DIAGNOSIS — S46.911D RIGHT SHOULDER STRAIN, SUBSEQUENT ENCOUNTER: ICD-10-CM

## 2021-03-09 PROCEDURE — 97014 ELECTRIC STIMULATION THERAPY: CPT

## 2021-03-09 PROCEDURE — 97140 MANUAL THERAPY 1/> REGIONS: CPT

## 2021-03-11 ENCOUNTER — PHYSICAL THERAPY (OUTPATIENT)
Dept: PHYSICAL THERAPY | Facility: REHABILITATION | Age: 56
End: 2021-03-11
Attending: NURSE PRACTITIONER
Payer: OTHER MISCELLANEOUS

## 2021-03-11 DIAGNOSIS — S46.911D RIGHT SHOULDER STRAIN, SUBSEQUENT ENCOUNTER: ICD-10-CM

## 2021-03-11 PROCEDURE — 97014 ELECTRIC STIMULATION THERAPY: CPT

## 2021-03-11 PROCEDURE — 97140 MANUAL THERAPY 1/> REGIONS: CPT

## 2021-03-11 NOTE — OP THERAPY DAILY TREATMENT
"  Outpatient Physical Therapy  DAILY TREATMENT     [facility] Physical Therapy [address].  [address]  [place] [address]  Phone:  [phone]  Fax:  [phone]    Date: 03/11/2021    Patient: Misael Echevarria  YOB: 1965  MRN: [id number]     Time Calculation    Start time: 0845  Stop time: 0937 Time Calculation (min): 52 minutes         Chief Complaint: No chief complaint on file.    Visit #: 5/6--confirmed with     SUBJECTIVE:  A little better recently because he has not been working much lately--noted patient holding his arm across his stomach stating that it was the only comfortable position he could find  OBJECTIVE:  R ghj AROM  Fle: 30 erp  abd 20 erp   er 10 erp          Therapeutic Treatments and Modalities:     Therapeutic Treatment and Modalities Summary:   Cupping to deltoid, levator PROM with approximation to reduce pain// 1/10 better  Caudal mobs at end range fle/abd er gd 1//  [place]izen [place] to infra//deltoid, levator and pec  5/5 mhp x15' mhp   tape for ghj instability    Time-based treatments/modalities:    Physical Therapy Timed Treatment Charges  Manual therapy minutes (CPT 41298): 25 minutes      Pain rating (1-10) before treatment:  5/10 at rest  Pain rating (1-10) after treatment: 2/10 \"     \" better with the tape\"  ASSESSMENT:   Pain and guarding continue to  Limit treatment today. Suspect significant RTC and labrum disruption.  Patient is waiting to see ortho specialist--focus at this time is to preserve ROm  PLAN/RECOMMENDATIONS:   Cont. tp recommend patient see an orthopedic specialist ASAP. Cont.  Mobs and PROM for pain management and to maintain joint motion       "

## 2021-03-15 DIAGNOSIS — Z23 NEED FOR VACCINATION: ICD-10-CM

## 2021-03-18 ENCOUNTER — PHYSICAL THERAPY (OUTPATIENT)
Dept: PHYSICAL THERAPY | Facility: REHABILITATION | Age: 56
End: 2021-03-18
Attending: NURSE PRACTITIONER
Payer: OTHER MISCELLANEOUS

## 2021-03-18 DIAGNOSIS — S46.911D RIGHT SHOULDER STRAIN, SUBSEQUENT ENCOUNTER: ICD-10-CM

## 2021-03-18 PROCEDURE — 97140 MANUAL THERAPY 1/> REGIONS: CPT

## 2021-03-18 PROCEDURE — 97014 ELECTRIC STIMULATION THERAPY: CPT

## 2021-03-20 ENCOUNTER — PATIENT OUTREACH (OUTPATIENT)
Dept: SCHEDULING | Facility: IMAGING CENTER | Age: 56
End: 2021-03-20

## 2021-03-20 NOTE — PROGRESS NOTES
Attempt #1    Outreach for COVID vaccine first dose.    Outreach Outcome: left voicemail    Transfer to [phone] if patient calls back to schedule appointment.

## 2021-04-07 ENCOUNTER — PHYSICAL THERAPY (OUTPATIENT)
Dept: PHYSICAL THERAPY | Facility: REHABILITATION | Age: 56
End: 2021-04-07
Attending: ORTHOPAEDIC SURGERY
Payer: OTHER MISCELLANEOUS

## 2021-04-07 DIAGNOSIS — S46.911D RIGHT SHOULDER STRAIN, SUBSEQUENT ENCOUNTER: ICD-10-CM

## 2021-04-07 PROCEDURE — 97110 THERAPEUTIC EXERCISES: CPT

## 2021-04-07 PROCEDURE — 97014 ELECTRIC STIMULATION THERAPY: CPT

## 2021-04-07 NOTE — OP THERAPY DAILY TREATMENT
"  Outpatient Physical Therapy  DAILY TREATMENT     [facility] Physical Therapy [address].  [address]  [place] [address]  Phone:  [phone]  Fax:  [phone]    Date: 04/07/2021    Patient: Misael Echevarria  YOB: 1965  MRN: [id number]     Time Calculation    Start time: 0415  Stop time: 0505 Time Calculation (min): 50 minutes         Chief Complaint: No chief complaint on file.    Visit #:   Patient received a cortisone inject two weeks some relief of resting pain.  Cont. Pain with movement.  MRI revealed labral tears    OBJECTIVE:  Resisted cuff tests:  IR: strong pain  Er strong mild  Drop arm strong mild  Empty can: moderate pain weak    R ghj AROM  Fle: 100 erp  abd 70 erp   er 20 erp          Therapeutic Treatments and Modalities:     Therapeutic Treatment and Modalities Summary: STM: sub scapularis  [place] #1 4 x 30\"\" tight , not worse\"   Modified box #1 in supine// reased pain after 3 reps  ## instructed patient  To try his ex regardless of how much pain he is in at the time, BUT not to increase pain--(a little pain with ex is ok, but should not be worse after ex)    Attempted ball roll but unable to tolerate motion-  -russian 10/10 [facility] with flasher for ER strength x 10'      Time-based treatments/modalities:    Physical Therapy Timed Treatment Charges  Manual therapy minutes (CPT 57266): 5 minutes  Therapeutic exercise minutes (CPT 95535): 20 minutes      Pain rating (1-10) before treatment:  4/10 at rest  Pain rating (1-10) after treatment: 2/10 \"  Ok, if I don't move it\"  Gh fle: 120  abd 80  er30    ASSESSMENT: ## recent MRI revealed RTC tendinopathy  With bursal side fraying and labral tears  Increased AROM with strong but painful RTC tests implicating sub scap and infraspinatus dysfunction--patient presented with limited tolerance to isotonic exercise but able to tolerate modified isometric ex for  ER w/ a/p plane motion  Severe trigger point tenderness to sub scap> " infra> deltoid  PLAN/RECOMMENDATIONS:   Trial of rocking to approximate joint, DN--sub scap and infra--progress prone angles or walk back angels as tolerated

## 2021-04-08 ENCOUNTER — PHYSICAL THERAPY (OUTPATIENT)
Dept: PHYSICAL THERAPY | Facility: REHABILITATION | Age: 56
End: 2021-04-08
Attending: ORTHOPAEDIC SURGERY
Payer: OTHER MISCELLANEOUS

## 2021-04-08 DIAGNOSIS — S46.911D RIGHT SHOULDER STRAIN, SUBSEQUENT ENCOUNTER: ICD-10-CM

## 2021-04-08 PROCEDURE — 97140 MANUAL THERAPY 1/> REGIONS: CPT

## 2021-04-08 PROCEDURE — 97110 THERAPEUTIC EXERCISES: CPT

## 2021-04-08 PROCEDURE — 97014 ELECTRIC STIMULATION THERAPY: CPT

## 2021-04-08 NOTE — OP THERAPY DAILY TREATMENT
"  Outpatient Physical Therapy  DAILY TREATMENT     [facility] Physical [address].  [address]  [place] [address]  Phone:  [phone]  Fax:  [phone]    Date: 04/08/2021    Patient: Misael Echevarria  YOB: 1965  MRN: [id number]     Time Calculation    Start time: 0345  Stop time: 0440 Time Calculation (min): 55 minutes         Chief Complaint: No chief complaint on file.    Visit #:   Really sore since 8 last night  Patient reported that since he rolled the ball while laying on his side yesterday, his arm has been worse  OBJECTIVE:  Resisted cuff tests:  Unable to assess due to pain    R ghj AROM  Fle: 65 erp  abd 50 erp   er 20 erp          Therapeutic Treatments and Modalities:     Therapeutic Treatment and Modalities Summary: STM: sub scapularis  [place] #1 4 x 30\"\" tight , not worse\"   Modified box #1 in supine// eased pain after 3 reps  Self assist AROM in supine and sitting    DN: Patient signed informed written release and verbally agreed with informed consent to procedure of dry needling   skin prep with isopropyl  Alcohol  -R sub scap// gh fle 90 \"better\"-- infra and deltoid--removed deltoid needle due to too much pain for the patient-patient agreed and toelrated leaving infraspinatus needle in place for e-stim w/ FDN  -MHP x 10'  -No adverse reactions observed post treatment    Russian 5/5 with MHP x 15'--attempted AAROM but too painful    Time-based treatments/modalities:    Physical Therapy Timed Treatment Charges  Manual therapy minutes (CPT 04454): 10 minutes  Therapeutic exercise minutes (CPT 65354): 15 minutes      Pain rating (1-10) before treatment:  8/10 at rest  Pain rating (1-10) after treatment: 2/10 \"  Ok, if I don't move it\"  Gh fle: 120  abd 80  er30    ASSESSMENT: patient reported a lot of pain since last visit after side-.lying rom exercise with ball roll.  Severe muscle guarding and pain limiting tolerance to exercises and " treatment  PLAN/RECOMMENDATIONS:   Trial of rocking to approximate joint, DN--sub scap and infra--progress prone angles or walk back angels as tolerated

## 2021-04-13 ENCOUNTER — PHYSICAL THERAPY (OUTPATIENT)
Dept: PHYSICAL THERAPY | Facility: REHABILITATION | Age: 56
End: 2021-04-13
Attending: ORTHOPAEDIC SURGERY
Payer: OTHER MISCELLANEOUS

## 2021-04-13 DIAGNOSIS — S46.911D RIGHT SHOULDER STRAIN, SUBSEQUENT ENCOUNTER: ICD-10-CM

## 2021-04-13 PROCEDURE — 97140 MANUAL THERAPY 1/> REGIONS: CPT

## 2021-04-13 PROCEDURE — 97014 ELECTRIC STIMULATION THERAPY: CPT

## 2021-04-13 PROCEDURE — 97110 THERAPEUTIC EXERCISES: CPT

## 2021-04-13 NOTE — OP THERAPY DAILY TREATMENT
"  Outpatient Physical Therapy  DAILY TREATMENT     [facility] Physical Therapy [address].  [address]  [place] [address]  Phone:  [phone]  Fax:  [phone]    Date: 04/13/2021    Patient: Misael Echevarria  YOB: 1965  MRN: [id number]     Time Calculation    Start time: 0317  Stop time: 0405 Time Calculation (min): 48 minutes         Chief Complaint: No chief complaint on file.    Visit #:   Subjectiive: \" a little less tight and feels better but still hurts to wash his hands  OBJECTIVE:  Resisted cuff tests:  Unable to assess due to pain    R ghj AROM  Fle: 70 erp  abd 90 erp   er 20 erp          Therapeutic Treatments and Modalities:     Therapeutic Treatment and Modalities Summary: Rocking: attempted but unable due to spasms  AAROM in supine for abd and flexion  Supine on bed with out of gravity abd/add with elbow on rolling stool x 1' 20-90- deg.  Supine aarom gh self assist rom--> bilateral GH flexion #2 band //\" does not hurt if my elbows stay straight\"--hep  STM: sub scapularis with AAROM for abd \" much better\"  [place] #1 4 x 30\"\" tight , not worse\"       [place]orean 5/5 with MHP x 15' to   Upper trap, sub scap, infra, pec and deltoid    Time-based treatments/modalities:    Physical Therapy Timed Treatment Charges  Manual therapy minutes (CPT 81409): 10 minutes  Therapeutic exercise minutes (CPT 09232): 15 minutes      Pain rating (1-10) before treatment:  6/10 at rest  Pain rating (1-10) after treatment: 2/10   Gh fle: 110  [id number]  [id number]    ASSESSMENT:   Patient reports that the most painful activity is washing his hands with his hand in front of him as compared to washing his hair or lifting his arms up--severe palpatory tenderness to R sub scap and reproduce comparable \" deep shoulder \" pain--resisted IR is painful --increased tolerance to strength exercise in sagittal plane w/o ER/IR  PLAN/RECOMMENDATIONS:   Trial of rocking to approximate joint, DN--sub scap and " infra--progress prone angles or walk back angels as tolerated

## 2021-04-15 ENCOUNTER — PHYSICAL THERAPY (OUTPATIENT)
Dept: PHYSICAL THERAPY | Facility: REHABILITATION | Age: 56
End: 2021-04-15
Attending: ORTHOPAEDIC SURGERY
Payer: OTHER MISCELLANEOUS

## 2021-04-15 DIAGNOSIS — S46.911D RIGHT SHOULDER STRAIN, SUBSEQUENT ENCOUNTER: ICD-10-CM

## 2021-04-15 PROCEDURE — 97140 MANUAL THERAPY 1/> REGIONS: CPT

## 2021-04-15 PROCEDURE — 97014 ELECTRIC STIMULATION THERAPY: CPT

## 2021-04-15 PROCEDURE — 97110 THERAPEUTIC EXERCISES: CPT

## 2021-04-15 NOTE — OP THERAPY DAILY TREATMENT
Outpatient Physical Therapy  DAILY TREATMENT     [facility] Physical Therapy [address]  [address]  [place] [address]  Phone:  [phone]  Fax:  [phone]    Date: 04/15/2021    Patient: Misael Echevarria  YOB: 1965  MRN: [id number]     Time Calculation    Start time: 0845  Stop time: 0935 Time Calculation (min): 50 minutes         Chief Complaint: No chief complaint on file.    Visit #:   A little more movement but still really sore all of the time  OBJECTIVE:  Resisted cuff tests:  Unable to assess due to pain    R ghj AROM  Fle: 110 erp  abd 60 erp   er 20 erp          Therapeutic Treatments and Modalities:     Therapeutic Treatment and Modalities Summary: Reviewed HEP  AAROM in supine for abd and flexion  Ball roll on wall --hep    Supine aarom gh self assist rom--> bilateral GH flexion #2 band //--hep  DN: Patient signed informed written release and verbally agreed with informed consent to procedure of dry needling   skin prep with isopropyl  Alcohol  -pec alfie and sub scap FDN w/ TENS probe  -MHP x 10'  -No adverse reactions observed post treatment      Macanese 5/5 infra/pec and deltoid with gh flexion in supine #1 band x 5' then 5/5 mhp    Time-based treatments/modalities:    Physical Therapy Timed Treatment Charges  Manual therapy minutes (CPT 31285): 15 minutes  Therapeutic exercise minutes (CPT 54199): 19 minutes      Pain rating (1-10) before treatment: 4/10 at rest--more pain with movement  Pain rating (1-10) after treatment: 2/10   Gh fle: 130  [id number]  [id number]    ASSESSMENT:   Increased ROM after treatment but treatment and tolerance is significant limited due to patient c/o palpatory tenderness and pain with arom , particularly anything involving abd/er  PLAN/RECOMMENDATIONS:   , DN?? --sub scap and infra--progress strength in sagittal plane as tolerated

## 2021-04-21 ENCOUNTER — PHYSICAL THERAPY (OUTPATIENT)
Dept: PHYSICAL THERAPY | Facility: REHABILITATION | Age: 56
End: 2021-04-21
Attending: ORTHOPAEDIC SURGERY
Payer: OTHER MISCELLANEOUS

## 2021-04-21 DIAGNOSIS — S46.911D RIGHT SHOULDER STRAIN, SUBSEQUENT ENCOUNTER: ICD-10-CM

## 2021-04-21 PROCEDURE — 97014 ELECTRIC STIMULATION THERAPY: CPT

## 2021-04-21 PROCEDURE — 97140 MANUAL THERAPY 1/> REGIONS: CPT

## 2021-04-21 PROCEDURE — 97110 THERAPEUTIC EXERCISES: CPT

## 2021-04-21 NOTE — OP THERAPY DAILY TREATMENT
Outpatient Physical Therapy  DAILY TREATMENT     [facility] [address]  [address]  [place] [address]  Phone:  [phone]  Fax:  [phone]    Date: 04/21/2021    Patient: Misael Echevarria  YOB: 1965  MRN: [id number]     Time Calculation    Start time: 0345  Stop time: 0435 Time Calculation (min): 50 minutes         Chief Complaint: No chief complaint on file.    Visit #:   About the same with washing hands and holding anything between is his hands  at waist level increases pain  OBJECTIVE:      R ghj AROM  Fle: 110 erp  abd 70 erp   er 30 erp          Therapeutic Treatments and Modalities:     Therapeutic Treatment and Modalities Summary: Reviewed HEP  Pulley--aarom//increased AAROM  Supine box #0--modified and flasher with focus on infra//   AAROM in supine for abd and flexion  Supine aarom gh self assist rom--> bilateral GH flexion #2 band //--hep  Gentle MRE for gh fle/abd  DN: Patient signed informed written release and verbally agreed with informed consent to procedure of dry needling   skin prep with isopropyl  Alcohol  -plats,  sub scap, upper trap, TENS probe  Lat delt- e-stim with DN  -russian to trap,deltoid pecs 5/5/ MHP x 15'  -No adverse reactions observed post treatment      Time-based treatments/modalities:    Physical Therapy Timed Treatment Charges  Manual therapy minutes (CPT 07125): 20 minutes  Therapeutic exercise minutes (CPT 55607): 10 minutes      Pain rating (1-10) before treatment: 3/10 at rest--more pain with movement  Pain rating (1-10) after treatment: 2/10   Gh fle: 130  [id number]  [id number]--[id number]  Instructed patient to get pulley  For home ex.  ASSESSMENT:   Slight increased AROM with limit greatest in ER due to pain.  PROM wfl with GHJ pproximation( fle: 170, abd 140 er: 70--but gentle approximation is required)--cont. (+) for biceps load test, apprehension  and relocation tests   PLAN/RECOMMENDATIONS:   , DN?? --sub scap and infra--progress  strength in sagittal plane as tolerated

## 2021-04-23 ENCOUNTER — PHYSICAL THERAPY (OUTPATIENT)
Dept: PHYSICAL THERAPY | Facility: REHABILITATION | Age: 56
End: 2021-04-23
Attending: ORTHOPAEDIC SURGERY
Payer: OTHER MISCELLANEOUS

## 2021-04-23 DIAGNOSIS — S46.911D RIGHT SHOULDER STRAIN, SUBSEQUENT ENCOUNTER: ICD-10-CM

## 2021-04-23 PROCEDURE — 97140 MANUAL THERAPY 1/> REGIONS: CPT

## 2021-04-23 PROCEDURE — 97110 THERAPEUTIC EXERCISES: CPT

## 2021-04-23 PROCEDURE — 97014 ELECTRIC STIMULATION THERAPY: CPT

## 2021-04-23 NOTE — OP THERAPY DAILY TREATMENT
Outpatient Physical Therapy  DAILY TREATMENT     [facility] Therapy [address].  [address]  [place] [address]  Phone:  [phone]  Fax:  [phone]    Date: 04/23/2021    Patient: Misael Echevarria  YOB: 1965  MRN: [id number]     Time Calculation    Start time: 0115  Stop time: 0205 Time Calculation (min): 50 minutes         Chief Complaint: No chief complaint on file.    Visit #:   Sore since last visit.. still having severe pain with washing hands and not able to shave due to severe pain.   Patient reports severe pain upon waking.  OBJECTIVE:      R ghj AROM  Fle: 110 erp  abd 80 --neutral er   er 30 erp          Therapeutic Treatments and Modalities:     Therapeutic Treatment and Modalities Summary:   Pulley--aarom//increased AAROM  Supine box #0--modified and flasher with focus on infra//   Gentle MRE in supine for abd and flexion w/ approximation  Gh fle 90  RS   Supine gh flexion with cupping to deltoid// ex. Limited to pain with and without cups  Ball roll up wall    Russian 5/5 x 15' x mhp          Time-based treatments/modalities:    Physical Therapy Timed Treatment Charges  Manual therapy minutes (CPT 86699): 15 minutes  Therapeutic exercise minutes (CPT 20031): 15 minutes      Pain rating (1-10) before treatment: 4/10 at rest--more pain with movement  Pain rating (1-10) after treatment: 4/10   Gh fle: 120  [id number]  Er 40--erp    ASSESSMENT:   Patient continues to be limited by pain with all activities w/ minimal tolerance to isotonic exercise.  Patient can tolerate limited isometric strength in certain positions that do not elicit  ER muscle activity  PLAN/RECOMMENDATIONS:   progress strength tolerated and maintain ROM as tolerated.    Recommend d/c in 1 visit if patient is unable to progress active/resisted  ROM (suspicion that this injury may not  heal with conservative treatment alone)

## 2021-04-29 ENCOUNTER — APPOINTMENT (OUTPATIENT)
Dept: PHYSICAL THERAPY | Facility: REHABILITATION | Age: 56
End: 2021-04-29
Attending: ORTHOPAEDIC SURGERY
Payer: OTHER MISCELLANEOUS

## 2021-04-29 ENCOUNTER — OCCUPATIONAL MEDICINE (OUTPATIENT)
Dept: OCCUPATIONAL MEDICINE | Facility: CLINIC | Age: 56
End: 2021-04-29
Payer: OTHER MISCELLANEOUS

## 2021-04-29 ENCOUNTER — APPOINTMENT (OUTPATIENT)
Dept: RADIOLOGY | Facility: IMAGING CENTER | Age: 56
End: 2021-04-29
Attending: NURSE PRACTITIONER
Payer: COMMERCIAL

## 2021-04-29 VITALS
DIASTOLIC BLOOD PRESSURE: 86 MMHG | HEIGHT: 70 IN | HEART RATE: 84 BPM | WEIGHT: 165 LBS | BODY MASS INDEX: 23.62 KG/M2 | RESPIRATION RATE: 16 BRPM | SYSTOLIC BLOOD PRESSURE: 138 MMHG | OXYGEN SATURATION: 96 % | TEMPERATURE: 98.6 F

## 2021-04-29 DIAGNOSIS — M54.2 CERVICALGIA: ICD-10-CM

## 2021-04-29 DIAGNOSIS — S16.1XXA STRAIN OF MUSCLE, FASCIA AND TENDON AT NECK LEVEL, INITIAL ENCOUNTER: ICD-10-CM

## 2021-04-29 PROCEDURE — 99213 OFFICE O/P EST LOW 20 MIN: CPT | Performed by: NURSE PRACTITIONER

## 2021-04-29 PROCEDURE — 72040 X-RAY EXAM NECK SPINE 2-3 VW: CPT | Mod: TC | Performed by: NURSE PRACTITIONER

## 2021-04-29 NOTE — PROGRESS NOTES
Subjective:     Misael Echevarria is a 55 y.o. male who presents for Follow-Up (WC DOI: 1/4/21 R Elbow, shoulder, Neck & Back)      DOI 1/8/21: Walking in the door and slipped on a wet floor. Initial visit for the neck. Patient states that he did not discuss the neck because his shoulder and elbow where hurting and he assumed that the pain was coming from holding his arm and shoulder in a certain position. He states that he does not recall any swelling or bruising in the neck. He states that when he feel back he landed on the shoulder mostly. He has intermittent headaches, but thought it was related to the shoulder. He states that with the dry needling he noticed it stopped most of the headache. He also received a trigger point injection in the shoulder which did alleviate some of his symptoms. Pain in the neck is described as radiating from shoulder blade to shoulder blade, then up to the head. He states that his head gets tired from holding it up. He also reports some radiating numbness that starts from the inner bicep and goes up to the neck. This is not typical given the mechanism of injury.  He has no pertinent negatives. He is taking Ibuprofen with mild relief. He is using heat every night on the shoulder and the neck, which is the only way he can sleep. He states that he completed multiple sessions of physical therapy and was sent back for reevaluation from Orthopedics. He states that he has been off work for 5 weeks there is no light duty. Xray ordered and results reviewed with patient. After speaking with RN case manager felt it was better to create a new C4 given the length of time between reporting. She will speak with  to notify.  Plan of care discussed with patient.     ROS: All systems were reviewed on intake form, form was reviewed and signed. See scanned documents in media. Pertinent positives and negatives included in HPI.    PMH: No pertinent past medical history to this problem  MEDS:  "Medications were reviewed in Epic  ALLERGIES: No Known Allergies     SOCHX: Works as  at [employer]  FH: No pertinent family history to this problem       Objective:     /86   Pulse 84   Temp 37 °C (98.6 °F)   Resp 16   Ht 1.778 m (5' 10\")   Wt 74.8 kg (165 lb)   SpO2 96%   BMI 23.68 kg/m²     [unfilled]    Cervical: Neg gross deformity. FROM, pos mild pain with flexion/extension.  Mild pain elicited with rotation. No gross deformity.  CN I-XII grossly intact. Neg JVD, cervical adenopathy, or cervical rigidity. Subjective numbness that radiates from the bicep to the shoulder then the neck. No bicep abnormalities noted      Cervical Xray 4/29/21:   IMPRESSION:     Grade 1 anterolisthesis of C4 on C5.     Degenerative changes as above described.     Carotid atherosclerotic plaque.    Assessment/Plan:       1. Strain of muscle, fascia and tendon at neck level, initial encounter  - DX-CERVICAL SPINE-2 OR 3 VIEWS; Future  - REFERRAL TO RADIOLOGY  - REFERRAL TO OUTPATIENT INTERVENTIONAL PAIN CLINIC    2. Cervicalgia  - MR-CERVICAL SPINE-W/O; Future  - REFERRAL TO RADIOLOGY  - REFERRAL TO OUTPATIENT INTERVENTIONAL PAIN CLINIC    Released to Restricted Duty FROM 4/29/2021 TO 5/20/2021     Follow-up in 3 weeks, if not seen by Physiatry  Restricted duty per Physiatry  Physiatry referral placed transfer care  Recommend continue with treatment plan in place for shoulder with Dr. Ovalles  Recommend continue with OTC Ibuprofen, ice, heat, an  d gentle range of motion and stretching exercises   Recommend follow-up with pcp regarding other incidental findings on Xray      Pt understands return to the ED immediately for immediate reevaluation for severe pain, numbness/tingling in fingertips,   severe headache, stiffness, or tightness in the neck, shoulders, upper back, or arms, trouble walking or moving the legs, no control over the bladder or bowels    Differential diagnosis, " natural history, supportive care, and indications for immediate follow-up discussed.    Approximately 25 minutes were spent in reviewing notes, preparing for visit, obtaining history, exam and evaluation, patient counseling/education and post visit documentation/orders.

## 2021-04-29 NOTE — OP THERAPY PROGRESS SUMMARY
Outpatient Physical Therapy  PROGRESS SUMMARY NOTE      [facility] Therapy [address].  [address]  [place] [address]  Phone:  [phone]  Fax:  [phone]    Date of Visit: 04/23/2021    Patient: Misael Echevarria  YOB: 1965  MRN: [id number]     Referring Provider: Yannick Ovalles M.D.  [id number]  [place] [address]   Referring Diagnosis Other shoulder lesions, right shoulder [M75.81]     Visit Diagnoses     ICD-10-CM   1. Right shoulder strain, subsequent encounter  S46.911D       Rehab Potential: fair -    Progress Report Period: 2/18/2020    Functional Assessment Used DASH          Objective Findings and Assessment:   Patient progression towards goals: Patient reported that he is still having severe pain with washing hands and not able to shave due to pain. Patient continues to report severe pain upon waking.    Patient has been given and progressive HEP including self aarom/prom, isometric and resisted AROM  exercises.  Patient tolerates very few AROM/strengthening exercises due to GHJ position , as long as active ER and abduction are not involved or controlled for he tolerates minimal supine resisted exercise.  He has been given multiple-position isometric exercises that he tolerates better than arom activities.   He has responded with increased AROM and increased tolerance to strengthening after manual treatment including STM, cupping, dry needling and e-stim. However, there is little carryover between treatments.     ASSESSMENT:   Patient continues to be limited by pain with all activities w/ minimal tolerance to isotonic/AROM exercise.  Patient can tolerate limited isometric strength in certain positions that do not elicit  ER or abduction muscle recruitment.  He presents with severe pain with all accessory joint motion and ligament testing, as well as, resisted RTC testing without GHJ manual stabilization.    PLAN/RECOMMENDATIONS:   progress  strength  As tolerated and utilize manual treatments as described above to enhace tolerance to progressive strengthening.     Recommend d/c in 1-3 visit if patient is unable to progress active/resisted  ROM (suspicios that this injury may not  heal with conservative treatment alone)    Objective findings and assessment details: R GHJ   AROM  Fle: 110 erp  abd 80 --neutral er   er 30 erp    PROM with slight jointapproximation   Fle: 170  abd 150  ER: 60    Goals:   Short Term Goals:   DASH less that 30%  Wash hands without pain  Merritt deodorant w/o pain  Reach into cupboard w/o pain  Short term goal time span:  2-4 weeks      Long Term Goals:    Dash less than 20 %  Lift > 15lbs above head w/o pain  Merritt shirt w/o pain  Sleep through night w/o pain  Long term goal time span:  4-6 weeks    Plan:   Planned therapy interventions:  Manual Therapy (CPT 73570), Therapeutic Exercise (CPT 32191) and E Stim Unattended (CPT 90131)  Other planned therapy interventions:  Dry needle   Frequency:  2x week  Duration in weeks:  8  Duration in visits:  10  Plan details:  Scap stability progression, IASTM DN, prom/arom and progressive HEP       Referring provider co-signature:  I have reviewed this plan of care and my co-signature certifies the need for services.     Certification Period: 04/23/2021 to 07/01/21    Physician Signature: ________________________________ Date: ______________

## 2021-04-29 NOTE — LETTER
"EMPLOYEE’S CLAIM FOR COMPENSATION/ REPORT OF INITIAL TREATMENT  FORM C-4    EMPLOYEE’S CLAIM - PROVIDE ALL INFORMATION REQUESTED   First Name  Misael Last Name  Wale Birthdate                    1965                Sex  male Claim Number   Home Address  [address] Age  55 y.o. Height  1.778 m (5' 10\") Weight  74.8 kg (165 lb) [facility]     [facility] Zip  [address] Telephone  [phone] (home)    Mailing Address  [address] [facility] Zip  [address] Primary Language Spoken  English    Insurer   Third Party   Misc Workers Comp   Employee's Occupation (Job Title) When Injury or Occupational Disease Occurred      Employer's Name  [employer]  Telephone  [phone]    Employer Address  [address]  [address]    Date of Injury  1/4/2021               Hour of Injury  5:15 PM Date Employer Notified  1/4/2021 Last Day of Work after Injury     or Occupational Disease  1/8/2021 Supervisor to Whom Injury     Reported  Aleta   Address or Location of Accident (if applicable)  [[address] ]   What were you doing at the time of accident? (if applicable)  Dropping off envelope    How did this injury or occupational disease occur? (Be specific an answer in detail. Use additional sheet if necessary)  I walked in the door the floor was wet and I slipped   If you believe that you have an occupational disease, when did you first have knowledge of the disability and it relationship to your employment?  N/A Witnesses to the Accident  None      Nature of Injury or Occupational Disease  Sprain  Part(s) of Body Injured or Affected  Elbow (R), Shoulder (R), Upper Back Area (Thoracic Area)    I certify that the above is true and correct to the best of my knowledge and that I have provided this information in order to obtain the benefits of " [place]  I hereby authorize any physician, chiropractor, surgeon, practitioner, or other person, any hospital, including [facility] or [facility], any medical service organization, any insurance company, or other institution or organization to release to each other, any medical or other information, including benefits paid or payable, pertinent to this injury or disease, except information relative to diagnosis, treatment and/or counseling for AIDS, psychological conditions, alcohol or controlled substances, for which I must give specific authorization.  A Photostat of this authorization shall be as valid as the original.     Date   Place   Employee’s Signature   THIS REPORT MUST BE COMPLETED AND MAILED WITHIN 3 WORKING DAYS OF TREATMENT   Place  [facility]  Name of [facility]   Date  4/29/2021 Diagnosis  (S16.1XXA) Strain of muscle, fascia and tendon at neck level, initial encounter  (M54.2) Cervicalgia Is there evidence the injured employee was under the              influence of alcohol and/or another controlled substance at the time of accident?   Hour  10:31 AM Description of Injury or Disease  Diagnoses of Strain of muscle, fascia and tendon at neck level, initial encounter and Cervicalgia were pertinent to this visit. No   Treatment  None indicated at this time  Have you advised the patient to remain off work five days or     more? No   X-Ray Findings      If Yes   From Date  To Date      From information given by the employee, together with medical evidence, can you directly connect this injury or occupational disease as job incurred?  No If No Full Duty    No Modified Duty  Yes   Is additional medical care by a physician indicated?  Yes If Modified Duty, Specify any Limitations / Restrictions  See D39   Do you know of any previous injury or disease  "contributing to this condition or occupational disease?                              Comments:Indeterminant   Date  4/29/2021 Print Doctor’s Name   ZACHARIAH Werner I certify the employer’s copy of  this form was mailed on:   Address  [address],   [address] Insurer’s Use Only     [facility]  [address]    Provider’s Tax ID Number  [id number] Telephone  Dept: [phone]         Signature:     Degree          ORIGINAL-TREATING PHYSICIAN OR CHIROPRACTOR    PAGE 2-INSURER/TPA    PAGE 3-EMPLOYER    PAGE 4-EMPLOYEE        Form C-4 (rev.10/07)           BRIEF DESCRIPTION OF RIGHTS AND BENEFITS  (Pursuant to [place] 616[place])    Notice of Injury or Occupational Disease (Incident Report Form C-1): If an injury or occupational disease (OD) arises out of and in the course of employment, you must provide written notice to your employer as soon as practicable, but no later than 7 days after the accident or OD. Your employer shall maintain a sufficient supply of the required forms.    Claim for Compensation (Form C-4): If medical treatment is sought, the form C-4 is available at the place of initial treatment. A completed \"Claim for Compensation\" (Form C-4) must be filed within 90 days after an accident or OD. The treating physician or chiropractor must, within 3 working days after treatment, complete and mail to the employer, the employer's insurer and third-party , the Claim for Compensation.    Medical Treatment: If you require medical treatment for your on-the-job injury or OD, you may be required to select a physician or chiropractor from a list provided by your workers’ compensation insurer, if it has contracted with an Organization for Managed Care (MCO) or Preferred Provider Organization (PPO) or providers of health care. If your employer has not entered into a contract with an MCO or PPO, you may select a physician or chiropractor from the Panel of Physicians and Chiropractors. Any medical " costs related to your industrial injury or OD will be paid by your insurer.    Temporary Total Disability (TTD): If your doctor has certified that you are unable to work for a period of at least 5 consecutive days, or 5 cumulative days in a 20-day period, or places restrictions on you that your employer does not accommodate, you may be entitled to TTD compensation.    Temporary Partial Disability (TPD): If the wage you receive upon reemployment is less than the compensation for TTD to which you are entitled, the insurer may be required to pay you TPD compensation to make up the difference. TPD can only be paid for a maximum of 24 months.    Permanent Partial Disability (PPD): When your medical condition is stable and there is an indication of a PPD as a result of your injury or OD, within 30 days, your insurer must arrange for an evaluation by a rating physician or chiropractor to determine the degree of your PPD. The amount of your PPD award depends on the date of injury, the results of the PPD evaluation, your age and wage.    Permanent Total Disability (PTD): If you are medically certified by a treating physician or chiropractor as permanently and totally disabled and have been granted a PTD status by your insurer, you are entitled to receive monthly benefits not to exceed 66 2/3% of your average monthly wage. The amount of your PTD payments is subject to reduction if you previously received a lump-sum PPD award.    Vocational Rehabilitation Services: You may be eligible for vocational rehabilitation services if you are unable to return to the job due to a permanent physical impairment or permanent restrictions as a result of your injury or occupational disease.    Transportation and Per Avila Reimbursement: You may be eligible for travel expenses and per avila associated with medical treatment.    Reopening: You may be able to reopen your claim if your condition worsens after claim closure.     Appeal Process: If  you disagree with a written determination issued by the insurer or the insurer does not respond to your request, you may appeal to the Department of Administration, , by following the instructions contained in your determination letter. You must appeal the determination within 70 days from the date of the determination letter at [address], [address], [place], [place] [address], or [address], [place], [place] [address]. If you disagree with the  decision, you may appeal to the Department of Administration, . You must file your appeal within 30 days from the date of the  decision letter at [address], [address], [place], [place] [address], or [address], [address], [place], [place] [address]. If you disagree with a decision of an , you may file a petition for judicial review with the District Court. You must do so within 30 days of the Appeal Officer’s decision. You may be represented by an  at your own expense or you may contact the [facility] for possible representation.    [place]  for Injured Workers (NAIW): If you disagree with a  decision, you may request that NAIW represent you without charge at an  Hearing. For information regarding denial of benefits, you may contact the [facility] at: [address], [address], [place] [address], [phone], or [address], [address], [place] [address], [phone]    To File a Complaint with the Division: If you wish to file a complaint with the  of the Division of Industrial Relations (DIR),  please contact the Workers’ Compensation Section, [address], [address], [place], [place] [address], telephone [phone], or [address], [place], [place] [address], telephone [phone].    For assistance with Workers’ Compensation Issues: You may  contact the [facility] Office for Consumer Health Assistance, [address] [facility] [address], [address], [place], [place] [address], Toll Free 1-470.456.3900, Web site: http://[facility].[place].gov/Programs/MAHESH E-mail: [email or web address].[email or web address].[facility]              __________________________________________________________________                                    _________________            Employee Name / Signature                                                                                                                            Date                                                                                                                                                                                                                              D-2 (rev. 10/20)

## 2021-04-29 NOTE — LETTER
[address],   [address] [place] [address]  Phone:  [phone] - Fax:  [phone]   Occupational Health [facility] Progress Report and Disability Certification  Date of Service: 4/29/2021   No Show:  No  Date / Time of Next Visit: 5/20/2021 @ 10am   Claim Information   Patient Name: Misael Echevarria  Claim Number:     Employer: [employer]  Date of Injury: 1/4/2021     Insurer / TPA: Misc Workers Comp  ID / SSN:     Occupation:   Diagnosis: Diagnoses of Strain of muscle, fascia and tendon at neck level, initial encounter and Cervicalgia were pertinent to this visit.    Medical Information   Related to Industrial Injury?   Comments:Indeterminant, length of time between symptoms and injury    Subjective Complaints:  DOI 1/8/21: Walking in the door and slipped on a wet floor. Initial visit for the neck. Patient states that he did not discuss the neck because his shoulder and elbow where hurting and he assumed that the pain was coming from holding his arm and shoulder in a certain position. He states that he does not recall any swelling or bruising in the neck. He states that when he feel back he landed on the shoulder mostly. He has intermittent headaches, but thought it was related to the shoulder. He states that with the dry needling he noticed it stopped most of the headache. He also received a trigger point injection in the shoulder which did alleviate some of his symptoms. Pain in the neck is described as radiating from shoulder blade to shoulder blade, then up to the head. He states that his head gets tired from holding it up. He also reports some radiating numbness that starts from the inner bicep and goes up to the neck. This is not typical given the mechanism of injury.  He has no pertinent negatives. He is taking Ibuprofen with mild relief. He is using heat every night on the shoulder and the neck, which is the only way  he can sleep. He states that he completed multiple sessions of physical therapy and was sent back for reevaluation from Orthopedics. He states that he has been off work for 5 weeks there is no light duty. Xray ordered and results reviewed with patient. After speaking with RN case manager felt it was better to create a new C4 given the length of time between reporting. She will speak with  to notify.  Plan of care discussed with patient.    Objective Findings: Cervical: Neg gross deformity. FROM, pos mild pain with flexion/extension.  Mild pain elicited with rotation. No gross deformity.  CN I-XII grossly intact. Neg JVD, cervical adenopathy, or cervical rigidity. Subjective numbness that radiates from the bicep to the shoulder then the neck. No bicep abnormalities noted      Cervical Xray 4/29/21:   IMPRESSION:     Grade 1 anterolisthesis of C4 on C5.     Degenerative changes as above described.     Carotid atherosclerotic plaque.   Pre-Existing Condition(s):     Assessment:   Initial Visit    Status: Discharged / Care Transfer  Permanent Disability:No    Plan: DiagnosticsTransfer Care    Diagnostics: X-rayMRI    Comments:  Follow-up in 3 weeks, if not seen by Physiatry  Restricted duty per Physiatry  Physiatry referral placed transfer care  Recommend continue with treatment plan in place for shoulder with Dr. Ovalles  Recommend continue with OTC Ibuprofen, ice, heat, an  d gentle range of motion and stretching exercises   Recommend follow-up with pcp regarding other incidental findings on Xray      Pt understands return to the ED immediately for immediate reevaluation for severe pain, numbness/tingling in fingertips,   severe headache, stiffness, or tightness in the neck, shoulders, upper back, or arms, trouble walking or moving the legs, no control over the bladder or bowels    Disability Information   Status: Released to Restricted Duty    From:  4/29/2021  Through: 5/20/2021 Restrictions are: Temporary      Physical Restrictions   Sitting:    Standing:    Stooping:    Bending:      Squatting:    Walking:    Climbing:    Pushing:      Pulling:    Other:    Reaching Above Shoulder (L):   Reaching Above Shoulder (R):       Reaching Below Shoulder (L):    Reaching Below Shoulder (R):      Not to exceed Weight Limits   Carrying(hrs):   Weight Limit(lb): < or = to 10 pounds Lifting(hrs):   Weight  Limit(lb): < or = to 10 pounds   Comments:      Repetitive Actions   Hands: i.e. Fine Manipulations from Grasping:     Feet: i.e. Operating Foot Controls:     Driving / Operate Machinery:     Provider Name:   ZACHARIAH Werner Physician Signature:  Physician Name:     Clinic Name / Location: [address],   [address] [address] Clinic Phone Number: Dept: [phone]   Appointment Time: 10:45 Am Visit Start Time: 10:31 AM   Check-In Time:  10:30 Am Visit Discharge Time:  12pm   Original-Treating Physician or Chiropractor    Page 2-Insurer/TPA    Page 3-Employer    Page 4-Employee

## 2021-05-06 ENCOUNTER — APPOINTMENT (OUTPATIENT)
Dept: PHYSICAL THERAPY | Facility: REHABILITATION | Age: 56
End: 2021-05-06
Attending: ORTHOPAEDIC SURGERY
Payer: OTHER MISCELLANEOUS

## 2021-05-10 ENCOUNTER — OFFICE VISIT (OUTPATIENT)
Dept: MEDICAL GROUP | Facility: PHYSICIAN GROUP | Age: 56
End: 2021-05-10
Payer: COMMERCIAL

## 2021-05-10 VITALS
SYSTOLIC BLOOD PRESSURE: 116 MMHG | HEIGHT: 70 IN | WEIGHT: 166 LBS | OXYGEN SATURATION: 96 % | TEMPERATURE: 99 F | DIASTOLIC BLOOD PRESSURE: 78 MMHG | HEART RATE: 82 BPM | BODY MASS INDEX: 23.77 KG/M2

## 2021-05-10 DIAGNOSIS — I65.23 ATHEROSCLEROSIS OF BOTH CAROTID ARTERIES: ICD-10-CM

## 2021-05-10 DIAGNOSIS — F17.210 NICOTINE DEPENDENCE, CIGARETTES, UNCOMPLICATED: ICD-10-CM

## 2021-05-10 PROCEDURE — 99214 OFFICE O/P EST MOD 30 MIN: CPT | Performed by: FAMILY MEDICINE

## 2021-05-10 RX ORDER — ATORVASTATIN CALCIUM 20 MG/1
20 TABLET, FILM COATED ORAL DAILY
Qty: 90 TABLET | Refills: 3 | Status: SHIPPED | OUTPATIENT
Start: 2021-05-10 | End: 2022-06-27 | Stop reason: SDUPTHER

## 2021-05-10 RX ORDER — CELECOXIB 200 MG/1
200 CAPSULE ORAL 2 TIMES DAILY
COMMUNITY
Start: 2021-04-28 | End: 2022-09-07

## 2021-05-10 RX ORDER — NICOTINE 21 MG/24HR
1 PATCH, TRANSDERMAL 24 HOURS TRANSDERMAL EVERY 24 HOURS
Qty: 30 PATCH | Refills: 1 | Status: SHIPPED | OUTPATIENT
Start: 2021-05-10

## 2021-05-10 NOTE — PROGRESS NOTES
CC: Abnormal neck imaging    HISTORY OF THE PRESENT ILLNESS: Patient is a 55 y.o. male.     Patient is here today to follow-up on a neck x-ray which showed bilateral atherosclerosis.  Neck x-ray was through [facility] and he was asked to follow-up with his primary care physician.      Patient has no history of stroke.  He has been a long-term smoker but has recently cut back down to 1 pack/day using nicotine patches.  He is hoping for prescription for the stepdown patch of 14 mg at this time.  He is open to statin and aspirin.  In the past he has had elevated cholesterol.  Labs were recently ordered but not yet completed.    Allergies: Patient has no known allergies.    Current Outpatient Medications Ordered in Epic   Medication Sig Dispense Refill   • celecoxib (CELEBREX) 200 MG Cap Take 200 mg by mouth 2 times a day.     • nicotine (NICODERM) 14 MG/24HR PATCH 24 HR Place 1 Patch on the skin every 24 hours. 30 Patch 1   • atorvastatin (LIPITOR) 20 MG Tab Take 1 tablet by mouth every day. 90 tablet 3   • aspirin EC (ECOTRIN) 81 MG Tablet Delayed Response Take 1 tablet by mouth every day. 90 tablet 3     No current Epic-ordered facility-administered medications on file.       Past Medical History:   Diagnosis Date   • Drug abuse (HCC)     History of IV abuse, clean since 2003       Past Surgical History:   Procedure Laterality Date   • APPENDECTOMY     • HERNIA REPAIR      bilateral inguinal    • OTHER ORTHOPEDIC SURGERY      RIght meniscus       Social History     Tobacco Use   • Smoking status: Current Every Day Smoker     Packs/day: 1.00     Years: 30.00     Pack years: 30.00   • Smokeless tobacco: Never Used   • Tobacco comment: using the patch    Substance Use Topics   • Alcohol use: No   • Drug use: No       Social History     Social History Narrative   • Not on file       Family History   Problem Relation Age of Onset   • Cancer Mother         lung cancer, smoker   • Cancer Brother         prostate, half  "brother       ROS:   No fever, chest pain, shortness of breath      Imaging: Cervical spine x-ray reviewed from April 29, 2021.    Exam: /78 (BP Location: Right arm, Patient Position: Sitting, BP Cuff Size: Adult)   Pulse 82   Temp 37.2 °C (99 °F) (Temporal)   Ht 1.778 m (5' 10\")   Wt 75.3 kg (166 lb)   SpO2 96%  Body mass index is 23.82 kg/m².    General: Well appearing, NAD  Neck: No carotid bruit bilaterally.  Pulmonary: Clear to ausculation.  Normal effort. No rales, ronchi, or wheezing.  Cardiovascular: Regular rate and rhythm without murmur, rubs or gallop.   Skin: Warm and dry.  No obvious lesions.  Musculoskeletal:  No extremity cyanosis, clubbing, or edema.  Psych: Normal mood and affect. Alert and oriented. Judgment and insight is normal.    Please note that this dictation was created using voice recognition software. I have made every reasonable attempt to correct obvious errors, but I expect that there are errors of grammar and possibly content that I did not discover before finalizing the note.      Assessment/Plan  Misael was seen today for medication refill.    Diagnoses and all orders for this visit:    Atherosclerosis of both carotid arteries  Noted on cervical spine x-ray.  Recommend ultrasound for further characterization of extent of plaque buildup/stenosis.  We will go ahead and start on aspirin and statin which patient is amenable to.  Discussed that typically unless there is severe stenosis, the treatment is just aspirin and statin.  It would also be recommended to quit smoking, as smoking certainly contributes to atherosclerosis.  In the event that he does have severe stenosis we discussed that he would get referred to vascular surgery for possible endarterectomy.  -     US-CAROTID DOPPLER BILAT; Future  -     atorvastatin (LIPITOR) 20 MG Tab; Take 1 tablet by mouth every day.  -     aspirin EC (ECOTRIN) 81 MG Tablet Delayed Response; Take 1 tablet by mouth every day.    Nicotine " dependence, uncomplicated (Current Smoker)  Chronic issue, but patient is cutting back.  Discussed importance of smoking cessation and atherosclerotic disease.  We will go ahead and send in stepdown pack for nicotine patches of 14 mg.  -     nicotine (NICODERM) 14 MG/24HR PATCH 24 HR; Place 1 Patch on the skin every 24 hours.      Follow-up in about 6 months or sooner if needed.    Charity Bain DO  [facility]

## 2021-05-12 ENCOUNTER — HOSPITAL ENCOUNTER (OUTPATIENT)
Dept: LAB | Facility: MEDICAL CENTER | Age: 56
End: 2021-05-12
Attending: FAMILY MEDICINE
Payer: COMMERCIAL

## 2021-05-12 DIAGNOSIS — Z00.00 ENCOUNTER FOR PREVENTIVE CARE: ICD-10-CM

## 2021-05-12 LAB
ALBUMIN SERPL BCP-MCNC: 4.3 G/DL (ref 3.2–4.9)
ALBUMIN/GLOB SERPL: 1.5 G/DL
ALP SERPL-CCNC: 65 U/L (ref 30–99)
ALT SERPL-CCNC: 12 U/L (ref 2–50)
ANION GAP SERPL CALC-SCNC: 11 MMOL/L (ref 7–16)
AST SERPL-CCNC: 15 U/L (ref 12–45)
BASOPHILS # BLD AUTO: 0.2 % (ref 0–1.8)
BASOPHILS # BLD: 0.03 K/UL (ref 0–0.12)
BILIRUB SERPL-MCNC: 0.5 MG/DL (ref 0.1–1.5)
BUN SERPL-MCNC: 13 MG/DL (ref 8–22)
CALCIUM SERPL-MCNC: 9.8 MG/DL (ref 8.5–10.5)
CHLORIDE SERPL-SCNC: 103 MMOL/L (ref 96–112)
CHOLEST SERPL-MCNC: 162 MG/DL (ref 100–199)
CO2 SERPL-SCNC: 23 MMOL/L (ref 20–33)
CREAT SERPL-MCNC: 0.76 MG/DL (ref 0.5–1.4)
EOSINOPHIL # BLD AUTO: 0.03 K/UL (ref 0–0.51)
EOSINOPHIL NFR BLD: 0.2 % (ref 0–6.9)
ERYTHROCYTE [DISTWIDTH] IN BLOOD BY AUTOMATED COUNT: 50.8 FL (ref 35.9–50)
GLOBULIN SER CALC-MCNC: 2.8 G/DL (ref 1.9–3.5)
GLUCOSE SERPL-MCNC: 92 MG/DL (ref 65–99)
HCT VFR BLD AUTO: 48.1 % (ref 42–52)
HDLC SERPL-MCNC: 76 MG/DL
HGB BLD-MCNC: 16.3 G/DL (ref 14–18)
IMM GRANULOCYTES # BLD AUTO: 0.05 K/UL (ref 0–0.11)
IMM GRANULOCYTES NFR BLD AUTO: 0.4 % (ref 0–0.9)
LDLC SERPL CALC-MCNC: 79 MG/DL
LYMPHOCYTES # BLD AUTO: 1.77 K/UL (ref 1–4.8)
LYMPHOCYTES NFR BLD: 12.9 % (ref 22–41)
MCH RBC QN AUTO: 32.6 PG (ref 27–33)
MCHC RBC AUTO-ENTMCNC: 33.9 G/DL (ref 33.7–35.3)
MCV RBC AUTO: 96.2 FL (ref 81.4–97.8)
MONOCYTES # BLD AUTO: 1.34 K/UL (ref 0–0.85)
MONOCYTES NFR BLD AUTO: 9.8 % (ref 0–13.4)
NEUTROPHILS # BLD AUTO: 10.49 K/UL (ref 1.82–7.42)
NEUTROPHILS NFR BLD: 76.5 % (ref 44–72)
NRBC # BLD AUTO: 0 K/UL
NRBC BLD-RTO: 0 /100 WBC
PLATELET # BLD AUTO: 191 K/UL (ref 164–446)
PMV BLD AUTO: 11.5 FL (ref 9–12.9)
POTASSIUM SERPL-SCNC: 4.3 MMOL/L (ref 3.6–5.5)
PROT SERPL-MCNC: 7.1 G/DL (ref 6–8.2)
RBC # BLD AUTO: 5 M/UL (ref 4.7–6.1)
SODIUM SERPL-SCNC: 137 MMOL/L (ref 135–145)
TRIGL SERPL-MCNC: 37 MG/DL (ref 0–149)
TSH SERPL DL<=0.005 MIU/L-ACNC: 0.9 UIU/ML (ref 0.38–5.33)
WBC # BLD AUTO: 13.7 K/UL (ref 4.8–10.8)

## 2021-05-12 PROCEDURE — 80053 COMPREHEN METABOLIC PANEL: CPT

## 2021-05-12 PROCEDURE — 36415 COLL VENOUS BLD VENIPUNCTURE: CPT

## 2021-05-12 PROCEDURE — 80061 LIPID PANEL: CPT

## 2021-05-12 PROCEDURE — 85025 COMPLETE CBC W/AUTO DIFF WBC: CPT

## 2021-05-12 PROCEDURE — 84443 ASSAY THYROID STIM HORMONE: CPT

## 2021-05-12 PROCEDURE — 82306 VITAMIN D 25 HYDROXY: CPT

## 2021-05-13 ENCOUNTER — HOSPITAL ENCOUNTER (OUTPATIENT)
Dept: RADIOLOGY | Facility: MEDICAL CENTER | Age: 56
End: 2021-05-13
Attending: FAMILY MEDICINE
Payer: COMMERCIAL

## 2021-05-13 DIAGNOSIS — I65.23 ATHEROSCLEROSIS OF BOTH CAROTID ARTERIES: ICD-10-CM

## 2021-05-13 PROCEDURE — 93880 EXTRACRANIAL BILAT STUDY: CPT

## 2021-05-14 DIAGNOSIS — D72.829 LEUKOCYTOSIS, UNSPECIFIED TYPE: ICD-10-CM

## 2021-05-14 LAB — 25(OH)D3 SERPL-MCNC: 27 NG/ML (ref 30–80)

## 2021-05-20 ENCOUNTER — APPOINTMENT (OUTPATIENT)
Dept: PHYSICAL THERAPY | Facility: REHABILITATION | Age: 56
End: 2021-05-20
Payer: OTHER MISCELLANEOUS

## 2021-05-24 ENCOUNTER — HOSPITAL ENCOUNTER (OUTPATIENT)
Dept: RADIOLOGY | Facility: MEDICAL CENTER | Age: 56
End: 2021-05-24
Attending: NURSE PRACTITIONER
Payer: OTHER MISCELLANEOUS

## 2021-05-24 DIAGNOSIS — M54.2 CERVICALGIA: ICD-10-CM

## 2021-05-24 PROCEDURE — 72141 MRI NECK SPINE W/O DYE: CPT

## 2021-12-17 ENCOUNTER — HOSPITAL ENCOUNTER (OUTPATIENT)
Dept: RADIOLOGY | Facility: MEDICAL CENTER | Age: 56
End: 2021-12-17
Attending: ORTHOPAEDIC SURGERY
Payer: OTHER MISCELLANEOUS

## 2021-12-17 DIAGNOSIS — M75.101 TEAR OF RIGHT ROTATOR CUFF, UNSPECIFIED TEAR EXTENT, UNSPECIFIED WHETHER TRAUMATIC: ICD-10-CM

## 2021-12-17 PROCEDURE — 73221 MRI JOINT UPR EXTREM W/O DYE: CPT | Mod: RT

## 2022-01-18 NOTE — OP THERAPY DAILY TREATMENT
"  Outpatient Physical Therapy  DAILY TREATMENT     [facility] [address]  [address]  [place] [address]  Phone:  [phone]  Fax:  [phone]    Date: 03/09/2021    Patient: Misael Echevarria  YOB: 1965  MRN: [id number]     Time Calculation    Start time: 0922  Stop time: 1000 Time Calculation (min): 38 minutes         Chief Complaint: No chief complaint on file.    Visit #: 5    SUBJECTIVE:  Recent flare up of r arm over the weekend .... really hurts today  OBJECTIVE:  R ghj AROM  Fle: 20 erp  abd 10 erp   er 10 erp          Therapeutic Treatments and Modalities:     Therapeutic Treatment and Modalities Summary:     PROM with approximation to reduce pain// 1/10 better  Caudal mobs at end range fle/abd er gd 1//  [place] to infra/supra 5/5 mhp x15' mhp  kinesio tape for ghj instability    Time-based treatments/modalities:    Physical Therapy Timed Treatment Charges  Manual therapy minutes (CPT 09378): 23 minutes      Pain rating (1-10) before treatment:  8/10 no pain  Pain rating (1-10) after treatment: 1/10 \"     \" almost no pain with the tape\"  ASSESSMENT:   Pain and guarding limiting treatment today.  Patient reporting increased pain and difficulty with any movement.  PLAN/RECOMMENDATIONS:   Recommend patient see an orthopedic specialist ASAP. Cont.  Mobs and PROM for pain management and to maintain joint motion       " ----- Message from Randy Batista DPM sent at 1/18/2022  1:18 PM CST -----  Regarding: RE: Referral for CINP  No problem, we will get her in to be seen. Thank you!  ----- Message -----  From: Marry New, PhD  Sent: 1/18/2022   8:17 AM CST  To: Randy Batista DPM  Subject: Referral for CINP                                Hey there. Patient with significant neuropathy in feet and toes. Has [employer] insurance. She would be interested in seeing you. History of Breast Cancer on Arimidex.

## 2022-06-27 DIAGNOSIS — I65.23 ATHEROSCLEROSIS OF BOTH CAROTID ARTERIES: ICD-10-CM

## 2022-06-27 NOTE — TELEPHONE ENCOUNTER
Received request via: Pharmacy    Was the patient seen in the last year in this department? No last seen 5/10/21    Does the patient have an active prescription (recently filled or refills available) for medication(s) requested? No

## 2022-06-29 RX ORDER — ATORVASTATIN CALCIUM 20 MG/1
20 TABLET, FILM COATED ORAL DAILY
Qty: 30 TABLET | Refills: 0 | Status: SHIPPED | OUTPATIENT
Start: 2022-06-29 | End: 2022-08-22 | Stop reason: SDUPTHER

## 2022-08-22 DIAGNOSIS — I65.23 ATHEROSCLEROSIS OF BOTH CAROTID ARTERIES: ICD-10-CM

## 2022-08-28 RX ORDER — ATORVASTATIN CALCIUM 20 MG/1
20 TABLET, FILM COATED ORAL DAILY
Qty: 30 TABLET | Refills: 0 | Status: SHIPPED | OUTPATIENT
Start: 2022-08-28 | End: 2022-09-07 | Stop reason: SDUPTHER

## 2022-09-07 ENCOUNTER — OFFICE VISIT (OUTPATIENT)
Dept: MEDICAL GROUP | Facility: PHYSICIAN GROUP | Age: 57
End: 2022-09-07

## 2022-09-07 VITALS — BODY MASS INDEX: 29.35 KG/M2 | HEIGHT: 70 IN | WEIGHT: 205 LBS

## 2022-09-07 DIAGNOSIS — E78.5 HYPERLIPIDEMIA, UNSPECIFIED HYPERLIPIDEMIA TYPE: ICD-10-CM

## 2022-09-07 DIAGNOSIS — F17.210 NICOTINE DEPENDENCE, CIGARETTES, UNCOMPLICATED: ICD-10-CM

## 2022-09-07 DIAGNOSIS — B18.1 CHRONIC VIRAL HEPATITIS B WITHOUT DELTA AGENT AND WITHOUT COMA (HCC): ICD-10-CM

## 2022-09-07 DIAGNOSIS — I65.23 ATHEROSCLEROSIS OF BOTH CAROTID ARTERIES: ICD-10-CM

## 2022-09-07 DIAGNOSIS — Z76.89 ENCOUNTER TO ESTABLISH CARE: ICD-10-CM

## 2022-09-07 DIAGNOSIS — E55.9 VITAMIN D DEFICIENCY: ICD-10-CM

## 2022-09-07 PROBLEM — G89.29 CHRONIC RIGHT SHOULDER PAIN: Status: ACTIVE | Noted: 2022-09-07

## 2022-09-07 PROBLEM — G89.29 NECK PAIN, CHRONIC: Status: ACTIVE | Noted: 2022-09-07

## 2022-09-07 PROBLEM — M54.2 NECK PAIN, CHRONIC: Status: ACTIVE | Noted: 2022-09-07

## 2022-09-07 PROBLEM — M25.511 CHRONIC RIGHT SHOULDER PAIN: Status: ACTIVE | Noted: 2022-09-07

## 2022-09-07 PROCEDURE — 99214 OFFICE O/P EST MOD 30 MIN: CPT | Performed by: STUDENT IN AN ORGANIZED HEALTH CARE EDUCATION/TRAINING PROGRAM

## 2022-09-07 RX ORDER — ATORVASTATIN CALCIUM 20 MG/1
20 TABLET, FILM COATED ORAL DAILY
Qty: 90 TABLET | Refills: 0 | Status: SHIPPED | OUTPATIENT
Start: 2022-09-07 | End: 2023-01-04 | Stop reason: SDUPTHER

## 2022-09-07 RX ORDER — MELOXICAM 15 MG/1
15 TABLET ORAL DAILY
Status: ON HOLD | COMMUNITY
End: 2023-04-13

## 2022-09-07 RX ORDER — GABAPENTIN 300 MG/1
300 CAPSULE ORAL
COMMUNITY

## 2022-09-07 ASSESSMENT — FIBROSIS 4 INDEX: FIB4 SCORE: 1.29

## 2022-09-07 ASSESSMENT — PATIENT HEALTH QUESTIONNAIRE - PHQ9: CLINICAL INTERPRETATION OF PHQ2 SCORE: 0

## 2022-09-07 NOTE — PROGRESS NOTES
"Subjective:     Chief Complaint   Patient presents with    Establish Care    Medication Refill     HISTORY OF THE PRESENT ILLNESS: Patient is a 57 y.o. male. This pleasant patient is here today to establish care and discuss medication refills. His/her prior PCP was Dr. Bain.    Patient here for medication refill of atorvastatin and 81 mg aspirin.  He reports that he lost his insurance which is the reason why he has not followed up.  He is currently undergoing treatment after a work-related injury and is on Workmen's Comp. for right shoulder and neck pain.  He states that he tolerates the medication well without adverse side effects.  Reviewed his past medical history.  Patient is very motivated to quit smoking and is down to half a pack per day now with use of the nicotine patch.  He does have a prior history of hepatitis B and C, has not been able to follow-up with gastroenterology due to insurance issues.  No GI concerns or complaints.    No problem-specific Assessment & Plan notes found for this encounter.    Current Outpatient Medications Ordered in Epic   Medication Sig Dispense Refill    gabapentin (NEURONTIN) 300 MG Cap Take 300 mg by mouth 2 times a day.      meloxicam (MOBIC) 15 MG tablet Take 15 mg by mouth every day.      atorvastatin (LIPITOR) 20 MG Tab Take 1 Tablet by mouth every day. 90 Tablet 0    aspirin EC (ECOTRIN) 81 MG Tablet Delayed Response Take 1 Tablet by mouth every day. 90 Tablet 3    nicotine (NICODERM) 14 MG/24HR PATCH 24 HR Place 1 Patch on the skin every 24 hours. 30 Patch 1     No current Epic-ordered facility-administered medications on file.     ROS:   Gen: no fevers/chills, no changes in weight  Eyes: no changes in vision  ENT: no sore throat  Pulm: no sob, no cough  CV: no chest pain, no palpitations  GI: no nausea/vomiting, no diarrhea  Skin: no rash  Neuro: no headaches, numbness/tingling  Heme/Lymph: no easy bruising      Objective:     Exam: Ht 1.778 m (5' 10\")   Wt 93 kg " (205 lb)  Body mass index is 29.41 kg/m².    General: Well developed, well nourished in no acute distress.  Head: Normocephalic and atraumatic.  Eyes: Conjunctivae and extraocular motions are normal. Pupils are equal, round. No scleral icterus.   Mouth/Throat: Wearing mask.  Neck: Supple. Thyroid is not grossly enlarged.  Pulmonary: Clear to ausculation.  Normal effort. No rales, ronchi, or wheezing.  Cardiovascular: Regular rate and rhythm without murmur.   Neurologic: No gross/focal deficits. Normal gait.   Lymph: No cervical or supraclavicular lymph nodes are palpable  Skin: Warm and dry.  No obvious lesions.  Musculoskeletal: No extremity cyanosis, clubbing, or edema.  Psych: Normal mood and affect. Alert and oriented x3. Judgment and insight is normal.    Labs: 5/12/2022    Assessment & Plan:   57 y.o. male with the following -    1. Encounter to establish care    2. Atherosclerosis of both carotid arteries  3. Hyperlipidemia, unspecified hyperlipidemia type  Chronic conditions, cholesterol well controlled last year reports he reports that he had been out of his medication for a few weeks so we will refill medication as below and recommend he trend his labs in the next 1 to 2 months after resuming medication.  - Comp Metabolic Panel; Future  - TSH WITH REFLEX TO FT4; Future  - Lipid Profile; Future  - atorvastatin (LIPITOR) 20 MG Tab; Take 1 Tablet by mouth every day.  Dispense: 90 Tablet; Refill: 0    4. Nicotine dependence, uncomplicated (Current Smoker)  This is a chronic condition, patient making great strides for tobacco cessation.  Gave encouragement and offered my support if he is struggling in the future.  - CBC WITH DIFFERENTIAL; Future    5. Vitamin D deficiency  Mildly decreased last year, trend.  - VITAMIN D,25 HYDROXY (DEFICIENCY); Future    6. Chronic viral hepatitis B without delta agent and without coma (HCC)  Doing well and last labs were normal, lost to follow-up with gastroenterology which I  recommended once able to follow-up with specialist.  Check hepatitis B virus PCR quant as below with upcoming labs.  - HBV PCR Quant; Future    Return in about 3 months (around 12/7/2022), or if symptoms worsen or fail to improve.    Please note that this dictation was created using voice recognition software. I have made every reasonable attempt to correct obvious errors, but I expect that there are errors of grammar and possibly content that I did not discover before finalizing the note.

## 2022-12-07 ENCOUNTER — APPOINTMENT (OUTPATIENT)
Dept: MEDICAL GROUP | Facility: PHYSICIAN GROUP | Age: 57
End: 2022-12-07
Payer: OTHER MISCELLANEOUS

## 2023-01-04 DIAGNOSIS — E78.5 HYPERLIPIDEMIA, UNSPECIFIED HYPERLIPIDEMIA TYPE: ICD-10-CM

## 2023-01-04 DIAGNOSIS — I65.23 ATHEROSCLEROSIS OF BOTH CAROTID ARTERIES: ICD-10-CM

## 2023-01-05 RX ORDER — ATORVASTATIN CALCIUM 20 MG/1
20 TABLET, FILM COATED ORAL DAILY
Qty: 90 TABLET | Refills: 0 | Status: SHIPPED | OUTPATIENT
Start: 2023-01-05 | End: 2023-03-14 | Stop reason: SDUPTHER

## 2023-03-14 ENCOUNTER — PRE-ADMISSION TESTING (OUTPATIENT)
Dept: ADMISSIONS | Facility: MEDICAL CENTER | Age: 58
DRG: 473 | End: 2023-03-14
Attending: ORTHOPAEDIC SURGERY
Payer: OTHER MISCELLANEOUS

## 2023-03-14 ENCOUNTER — TELEPHONE (OUTPATIENT)
Dept: URGENT CARE | Facility: PHYSICIAN GROUP | Age: 58
End: 2023-03-14

## 2023-03-14 ENCOUNTER — HOSPITAL ENCOUNTER (OUTPATIENT)
Facility: MEDICAL CENTER | Age: 58
End: 2023-03-14
Attending: STUDENT IN AN ORGANIZED HEALTH CARE EDUCATION/TRAINING PROGRAM

## 2023-03-14 ENCOUNTER — HOSPITAL ENCOUNTER (OUTPATIENT)
Dept: RADIOLOGY | Facility: MEDICAL CENTER | Age: 58
DRG: 473 | End: 2023-03-14
Attending: ORTHOPAEDIC SURGERY | Admitting: ORTHOPAEDIC SURGERY
Payer: OTHER MISCELLANEOUS

## 2023-03-14 DIAGNOSIS — Z01.810 PRE-OPERATIVE CARDIOVASCULAR EXAMINATION: ICD-10-CM

## 2023-03-14 DIAGNOSIS — Z01.812 PRE-OPERATIVE LABORATORY EXAMINATION: ICD-10-CM

## 2023-03-14 DIAGNOSIS — E78.5 HYPERLIPIDEMIA, UNSPECIFIED HYPERLIPIDEMIA TYPE: ICD-10-CM

## 2023-03-14 DIAGNOSIS — I65.23 ATHEROSCLEROSIS OF BOTH CAROTID ARTERIES: ICD-10-CM

## 2023-03-14 DIAGNOSIS — Z01.811 PRE-OPERATIVE RESPIRATORY EXAMINATION: ICD-10-CM

## 2023-03-14 LAB
25(OH)D3 SERPL-MCNC: 24 NG/ML (ref 30–100)
ABO GROUP BLD: NORMAL
ALBUMIN SERPL BCP-MCNC: 4.9 G/DL (ref 3.2–4.9)
ALBUMIN/GLOB SERPL: 1.8 G/DL
ALP SERPL-CCNC: 84 U/L (ref 30–99)
ALT SERPL-CCNC: 22 U/L (ref 2–50)
ANION GAP SERPL CALC-SCNC: 13 MMOL/L (ref 7–16)
APPEARANCE UR: CLEAR
APTT PPP: 31 SEC (ref 24.7–36)
AST SERPL-CCNC: 18 U/L (ref 12–45)
BASOPHILS # BLD AUTO: 0.7 % (ref 0–1.8)
BASOPHILS # BLD: 0.05 K/UL (ref 0–0.12)
BILIRUB SERPL-MCNC: 0.6 MG/DL (ref 0.1–1.5)
BILIRUB UR QL STRIP.AUTO: NEGATIVE
BLD GP AB SCN SERPL QL: NORMAL
BUN SERPL-MCNC: 15 MG/DL (ref 8–22)
CALCIUM ALBUM COR SERPL-MCNC: 9 MG/DL (ref 8.5–10.5)
CALCIUM SERPL-MCNC: 9.7 MG/DL (ref 8.5–10.5)
CHLORIDE SERPL-SCNC: 104 MMOL/L (ref 96–112)
CO2 SERPL-SCNC: 23 MMOL/L (ref 20–33)
COLOR UR: YELLOW
CREAT SERPL-MCNC: 0.76 MG/DL (ref 0.5–1.4)
EKG IMPRESSION: NORMAL
EOSINOPHIL # BLD AUTO: 0.15 K/UL (ref 0–0.51)
EOSINOPHIL NFR BLD: 2.2 % (ref 0–6.9)
ERYTHROCYTE [DISTWIDTH] IN BLOOD BY AUTOMATED COUNT: 49.1 FL (ref 35.9–50)
GFR SERPLBLD CREATININE-BSD FMLA CKD-EPI: 104 ML/MIN/1.73 M 2
GLOBULIN SER CALC-MCNC: 2.7 G/DL (ref 1.9–3.5)
GLUCOSE SERPL-MCNC: 99 MG/DL (ref 65–99)
GLUCOSE UR STRIP.AUTO-MCNC: NEGATIVE MG/DL
HCT VFR BLD AUTO: 51.3 % (ref 42–52)
HGB BLD-MCNC: 17.3 G/DL (ref 14–18)
IMM GRANULOCYTES # BLD AUTO: 0.02 K/UL (ref 0–0.11)
IMM GRANULOCYTES NFR BLD AUTO: 0.3 % (ref 0–0.9)
INR PPP: 0.97 (ref 0.87–1.13)
KETONES UR STRIP.AUTO-MCNC: NEGATIVE MG/DL
LEUKOCYTE ESTERASE UR QL STRIP.AUTO: NEGATIVE
LYMPHOCYTES # BLD AUTO: 2.07 K/UL (ref 1–4.8)
LYMPHOCYTES NFR BLD: 30.2 % (ref 22–41)
MCH RBC QN AUTO: 31.4 PG (ref 27–33)
MCHC RBC AUTO-ENTMCNC: 33.7 G/DL (ref 33.7–35.3)
MCV RBC AUTO: 93.1 FL (ref 81.4–97.8)
MICRO URNS: NORMAL
MONOCYTES # BLD AUTO: 0.7 K/UL (ref 0–0.85)
MONOCYTES NFR BLD AUTO: 10.2 % (ref 0–13.4)
NEUTROPHILS # BLD AUTO: 3.87 K/UL (ref 1.82–7.42)
NEUTROPHILS NFR BLD: 56.4 % (ref 44–72)
NITRITE UR QL STRIP.AUTO: NEGATIVE
NRBC # BLD AUTO: 0 K/UL
NRBC BLD-RTO: 0 /100 WBC
PH UR STRIP.AUTO: 6 [PH] (ref 5–8)
PLATELET # BLD AUTO: 190 K/UL (ref 164–446)
PMV BLD AUTO: 11.3 FL (ref 9–12.9)
POTASSIUM SERPL-SCNC: 4.3 MMOL/L (ref 3.6–5.5)
PROT SERPL-MCNC: 7.6 G/DL (ref 6–8.2)
PROT UR QL STRIP: NEGATIVE MG/DL
PROTHROMBIN TIME: 12.8 SEC (ref 12–14.6)
RBC # BLD AUTO: 5.51 M/UL (ref 4.7–6.1)
RBC UR QL AUTO: NEGATIVE
RH BLD: NORMAL
SODIUM SERPL-SCNC: 140 MMOL/L (ref 135–145)
SP GR UR STRIP.AUTO: 1.01
UROBILINOGEN UR STRIP.AUTO-MCNC: 0.2 MG/DL
WBC # BLD AUTO: 6.9 K/UL (ref 4.8–10.8)

## 2023-03-14 PROCEDURE — 93010 ELECTROCARDIOGRAM REPORT: CPT | Performed by: INTERNAL MEDICINE

## 2023-03-14 PROCEDURE — 80053 COMPREHEN METABOLIC PANEL: CPT

## 2023-03-14 PROCEDURE — 36415 COLL VENOUS BLD VENIPUNCTURE: CPT

## 2023-03-14 PROCEDURE — 80061 LIPID PANEL: CPT

## 2023-03-14 PROCEDURE — 71046 X-RAY EXAM CHEST 2 VIEWS: CPT

## 2023-03-14 PROCEDURE — 86900 BLOOD TYPING SEROLOGIC ABO: CPT

## 2023-03-14 PROCEDURE — 86901 BLOOD TYPING SEROLOGIC RH(D): CPT

## 2023-03-14 PROCEDURE — 85610 PROTHROMBIN TIME: CPT

## 2023-03-14 PROCEDURE — 85730 THROMBOPLASTIN TIME PARTIAL: CPT

## 2023-03-14 PROCEDURE — 81003 URINALYSIS AUTO W/O SCOPE: CPT

## 2023-03-14 PROCEDURE — 87517 HEPATITIS B DNA QUANT: CPT

## 2023-03-14 PROCEDURE — 82306 VITAMIN D 25 HYDROXY: CPT

## 2023-03-14 PROCEDURE — 85025 COMPLETE CBC W/AUTO DIFF WBC: CPT

## 2023-03-14 PROCEDURE — 93005 ELECTROCARDIOGRAM TRACING: CPT

## 2023-03-14 PROCEDURE — 86850 RBC ANTIBODY SCREEN: CPT

## 2023-03-14 RX ORDER — ATORVASTATIN CALCIUM 20 MG/1
20 TABLET, FILM COATED ORAL DAILY
Qty: 90 TABLET | Refills: 0 | Status: SHIPPED
Start: 2023-03-14 | End: 2023-03-21

## 2023-03-14 ASSESSMENT — FIBROSIS 4 INDEX: FIB4 SCORE: 1.29

## 2023-03-14 NOTE — TELEPHONE ENCOUNTER
Phone Number Called: There are no phone numbers on file.      Call outcome: Spoke to patient regarding message below.    Message: Pt stopped by the office asking if the Lipid panel can be added to his labs that were drawn today. Called lab and spoke to Juanita Carlson she was going to add the Lipid. Pt wanted pcp to know labs were done today and is requesting a refill on Lipitor. Please Advise.

## 2023-03-16 ENCOUNTER — OFFICE VISIT (OUTPATIENT)
Dept: MEDICAL GROUP | Facility: PHYSICIAN GROUP | Age: 58
End: 2023-03-16
Payer: OTHER MISCELLANEOUS

## 2023-03-16 VITALS
RESPIRATION RATE: 20 BRPM | TEMPERATURE: 97.1 F | WEIGHT: 215 LBS | OXYGEN SATURATION: 93 % | HEIGHT: 71 IN | SYSTOLIC BLOOD PRESSURE: 176 MMHG | DIASTOLIC BLOOD PRESSURE: 86 MMHG | BODY MASS INDEX: 30.1 KG/M2 | HEART RATE: 80 BPM

## 2023-03-16 DIAGNOSIS — Z01.818 PREOPERATIVE CLEARANCE: ICD-10-CM

## 2023-03-16 DIAGNOSIS — I65.23 ATHEROSCLEROSIS OF BOTH CAROTID ARTERIES: ICD-10-CM

## 2023-03-16 DIAGNOSIS — B18.1 CHRONIC VIRAL HEPATITIS B WITHOUT DELTA AGENT AND WITHOUT COMA (HCC): ICD-10-CM

## 2023-03-16 DIAGNOSIS — R03.0 ELEVATED BLOOD PRESSURE READING WITHOUT DIAGNOSIS OF HYPERTENSION: ICD-10-CM

## 2023-03-16 DIAGNOSIS — D75.1 POLYCYTHEMIA: ICD-10-CM

## 2023-03-16 DIAGNOSIS — F17.210 CIGARETTE SMOKER: ICD-10-CM

## 2023-03-16 DIAGNOSIS — E55.9 VITAMIN D DEFICIENCY: ICD-10-CM

## 2023-03-16 DIAGNOSIS — E78.5 HYPERLIPIDEMIA, UNSPECIFIED HYPERLIPIDEMIA TYPE: ICD-10-CM

## 2023-03-16 LAB
CHOLEST SERPL-MCNC: 216 MG/DL (ref 100–199)
HDLC SERPL-MCNC: 52 MG/DL
LDLC SERPL CALC-MCNC: 135 MG/DL
TRIGL SERPL-MCNC: 145 MG/DL (ref 0–149)

## 2023-03-16 PROCEDURE — 99215 OFFICE O/P EST HI 40 MIN: CPT | Performed by: STUDENT IN AN ORGANIZED HEALTH CARE EDUCATION/TRAINING PROGRAM

## 2023-03-16 ASSESSMENT — PATIENT HEALTH QUESTIONNAIRE - PHQ9: CLINICAL INTERPRETATION OF PHQ2 SCORE: 0

## 2023-03-16 ASSESSMENT — FIBROSIS 4 INDEX: FIB4 SCORE: 1.151283868320296345

## 2023-03-16 NOTE — PATIENT INSTRUCTIONS
Restart D3 1000IU/25mcg daily    Ideal blood pressure <130/80 and at least <140/90.  Bring in log/machine to follow up next week.

## 2023-03-16 NOTE — PROGRESS NOTES
Subjective:     Chief Complaint   Patient presents with    Medical Clearance     HPI:   Misael presents today for preoperative evaluation.    Patient is scheduled for a anterior cervical decompression and fusion 3/30/2023 under general anesthesia expected to last approximately 3 hours.    Patient denies any personal/family history of complications with anesthesia. Denies history of angina or respiratory issues. Patient has excellent exercise tolerance exceeding 4 METS. Has never had any seizures, MI, CVA or been diagnosed with kidney/liver disease, thyroid disease, CHF or arrhythmias. Denies sleep apnea (STOPBANG 3).  Patient reports compliance with his atorvastatin and baby aspirin given his history of carotid artery atherosclerosis.    Very stressed today (traffic etc).     Cutting back on smoking, now mostly 3 per daily (5 today).  Patient has no history of hypertension, but is not surprised about his elevated blood pressure today because he was quite nervous about this appointment and was worried about being late after hitting traffic. Has a wrist cuff at home and not elevated.    Current Outpatient Medications Ordered in Epic   Medication Sig Dispense Refill    aspirin EC (ECOTRIN) 81 MG Tablet Delayed Response Take 1 Tablet by mouth every day. 90 Tablet 3    atorvastatin (LIPITOR) 20 MG Tab Take 1 Tablet by mouth every day. 90 Tablet 0    gabapentin (NEURONTIN) 300 MG Cap Take 300 mg by mouth 2 times a day.      meloxicam (MOBIC) 15 MG tablet Take 15 mg by mouth every day.      nicotine (NICODERM) 14 MG/24HR PATCH 24 HR Place 1 Patch on the skin every 24 hours. 30 Patch 1     No current Epic-ordered facility-administered medications on file.     ROS:  Gen: no fevers/chills, no changes in weight  Eyes: no changes in vision  ENT: no sore throat  Pulm: no sob, no cough  CV: no chest pain, no palpitations or dizziness   GI: no nausea/vomiting, no diarrhea  MSk: no myalgias  Skin: no rash  Neuro: no headaches, no  "numbness/tingling  Heme/Lymph: no easy bruising    Objective:     Exam:  BP (!) 176/86 (BP Location: Left arm, Patient Position: Sitting, BP Cuff Size: Adult)   Pulse 80   Temp 36.2 °C (97.1 °F) (Temporal)   Resp 20   Ht 1.803 m (5' 11\")   Wt 97.5 kg (215 lb)   SpO2 93%   BMI 29.99 kg/m²  Body mass index is 29.99 kg/m².    Physical Exam:  Constitutional: Well-developed and well-nourished. No acute distress.   Skin: Skin is warm and dry. No rash noted.  Head: Atraumatic without lesions.  Eyes: Conjunctivae and extraocular motions are normal. Pupils are equal, round. No scleral icterus.   Mouth/Throat: Wearing mask.  Neck: Supple, trachea midline. Normal range of motion.  No carotid bruits.  Cardiovascular: Regular rate and rhythm, S1 and S2 without murmur, rubs, or gallops.  Lungs: Normal inspiratory effort, CTA bilaterally, no wheezes/rhonchi/rales  Extremities: No cyanosis, clubbing, erythema, nor edema.  Neurological: Alert and oriented x 3. No gross/focal deficits.  Psychiatric:    Appearance: Clothing and grooming normal.  Mood: 'I'm under a lot of stress'  Behavior: No psychomotor abnormalities or impulsivity. Attention is good. Patient is pleasant and cooperative.   Eye contact: good   Affect: mood-congruent  Speech/thought content: Normal speech pattern. Normal insight and reasoning, no evidence of psychotic process.    Labs: Reviewed from 5/12/2021 (lipid panel), 3/14/2023  Imaging: Reviewed from EKG and CXR 3/14/2023    Assessment & Plan:     57 y.o. male with the following -     1. Preoperative clearance  Clearance letter faxed.  Instructed follow-up next week to trend blood pressure.    2. Hyperlipidemia, unspecified hyperlipidemia type  This is a chronic condition for which patient has had excellent compliance with his statin.  Lipids from May 2021 were at goal, unfortunately his most recent lipid panel is not yet resulted (called laboratory prior to his appointment to ensure this would be added " onto his labs from the 14th.  Patient to continue atorvastatin 20 mg pending results.  He continues with 81 mg aspirin daily as well for primary prevention which will be held as directed by surgeon prior to surgery.    3. Atherosclerosis of both carotid arteries  Noted 5/2021, no bruits appreciated on today's exam and patient is asymptomatic.  Continue statin and aspirin as above.    4. Elevated blood pressure reading without diagnosis of hypertension  This is an acute condition, suspect related to stress.  Patient reports that at recent appointments has been well controlled and his last visit in September blood pressure was not elevated.  Recommend blood pressure log for the next week and follow-up with the MA visit to repeat as well as crosscheck his home machine.  If persistently elevated will discuss treatment.  Patient actively working on quitting smoking.    5. Cigarette smoker  This is a chronic condition, patient doing an excellent job with cutting back on smoking.  In September was smoking approximately 10 cigarettes/day and has gone down to typically 3.  Continues with nicotine patch 14 mg daily.  Gave encouragement for complete cessation.    6. Polycythemia  Acute on chronic with hematocrit less than 54%, suspect due to cigarette use as he denies any lung complaints and has no history of chronic lung disease.  Denies symptoms concerning for sleep apnea.  Will monitor with future labs, recommend tobacco cessation.    7. Vitamin D deficiency  Acute on chronic, patient not currently taking vitamin D so recommend he resume 1000 international units D3 daily.    I spent a total of 46 minutes with record review, exam, communication with the patient, and documentation of this encounter.    Return in about 1 week (around 3/23/2023), or if symptoms worsen or fail to improve, for Blood pressure (MA visit).    Please note that this dictation was created using voice recognition software. I have made every reasonable  attempt to correct obvious errors, but I expect that there are errors of grammar and possibly content that I did not discover before finalizing the note.

## 2023-03-21 DIAGNOSIS — E78.5 HYPERLIPIDEMIA, UNSPECIFIED HYPERLIPIDEMIA TYPE: ICD-10-CM

## 2023-03-21 DIAGNOSIS — I65.23 ATHEROSCLEROSIS OF BOTH CAROTID ARTERIES: ICD-10-CM

## 2023-03-21 RX ORDER — ATORVASTATIN CALCIUM 20 MG/1
20 TABLET, FILM COATED ORAL DAILY
Qty: 90 TABLET | Refills: 0 | Status: CANCELLED | OUTPATIENT
Start: 2023-03-21

## 2023-03-21 RX ORDER — ATORVASTATIN CALCIUM 40 MG/1
40 TABLET, FILM COATED ORAL NIGHTLY
Qty: 90 TABLET | Refills: 1 | Status: SHIPPED | OUTPATIENT
Start: 2023-03-21 | End: 2023-12-19 | Stop reason: SDUPTHER

## 2023-03-21 NOTE — TELEPHONE ENCOUNTER
PatientPatient    Was the patient seen in the last year in this department? Yes    Does the patient have an active prescription (recently filled or refills available) for medication(s) requested? No    Does the patient have detention Plus and need 100 day supply (blood pressure, diabetes and cholesterol meds only)? Patient does not have SCP

## 2023-04-12 ENCOUNTER — APPOINTMENT (OUTPATIENT)
Dept: RADIOLOGY | Facility: MEDICAL CENTER | Age: 58
DRG: 473 | End: 2023-04-12
Attending: ORTHOPAEDIC SURGERY
Payer: OTHER MISCELLANEOUS

## 2023-04-12 ENCOUNTER — ANESTHESIA EVENT (OUTPATIENT)
Dept: SURGERY | Facility: MEDICAL CENTER | Age: 58
DRG: 473 | End: 2023-04-12
Payer: OTHER MISCELLANEOUS

## 2023-04-12 ENCOUNTER — HOSPITAL ENCOUNTER (INPATIENT)
Facility: MEDICAL CENTER | Age: 58
LOS: 1 days | DRG: 473 | End: 2023-04-13
Attending: ORTHOPAEDIC SURGERY | Admitting: ORTHOPAEDIC SURGERY
Payer: OTHER MISCELLANEOUS

## 2023-04-12 DIAGNOSIS — G89.29 NECK PAIN, CHRONIC: ICD-10-CM

## 2023-04-12 DIAGNOSIS — M25.511 CHRONIC RIGHT SHOULDER PAIN: ICD-10-CM

## 2023-04-12 DIAGNOSIS — G89.29 CHRONIC RIGHT SHOULDER PAIN: ICD-10-CM

## 2023-04-12 DIAGNOSIS — M54.2 NECK PAIN, CHRONIC: ICD-10-CM

## 2023-04-12 LAB
ABO GROUP BLD: NORMAL
BLD GP AB SCN SERPL QL: NORMAL
RH BLD: NORMAL

## 2023-04-12 PROCEDURE — 700101 HCHG RX REV CODE 250: Performed by: ORTHOPAEDIC SURGERY

## 2023-04-12 PROCEDURE — 502000 HCHG MISC OR IMPLANTS RC 0278: Performed by: ORTHOPAEDIC SURGERY

## 2023-04-12 PROCEDURE — 700105 HCHG RX REV CODE 258: Performed by: PHYSICIAN ASSISTANT

## 2023-04-12 PROCEDURE — 0RT30ZZ RESECTION OF CERVICAL VERTEBRAL DISC, OPEN APPROACH: ICD-10-PCS | Performed by: ORTHOPAEDIC SURGERY

## 2023-04-12 PROCEDURE — 160009 HCHG ANES TIME/MIN: Performed by: ORTHOPAEDIC SURGERY

## 2023-04-12 PROCEDURE — 95940 IONM IN OPERATNG ROOM 15 MIN: CPT | Performed by: ORTHOPAEDIC SURGERY

## 2023-04-12 PROCEDURE — C1713 ANCHOR/SCREW BN/BN,TIS/BN: HCPCS | Performed by: ORTHOPAEDIC SURGERY

## 2023-04-12 PROCEDURE — 86850 RBC ANTIBODY SCREEN: CPT

## 2023-04-12 PROCEDURE — 160041 HCHG SURGERY MINUTES - EA ADDL 1 MIN LEVEL 4: Performed by: ORTHOPAEDIC SURGERY

## 2023-04-12 PROCEDURE — 00670 ANES XTNSV SP&SPI CORD PX: CPT | Performed by: ANESTHESIOLOGY

## 2023-04-12 PROCEDURE — 700105 HCHG RX REV CODE 258: Performed by: ORTHOPAEDIC SURGERY

## 2023-04-12 PROCEDURE — 700111 HCHG RX REV CODE 636 W/ 250 OVERRIDE (IP): Performed by: ORTHOPAEDIC SURGERY

## 2023-04-12 PROCEDURE — 95937 NEUROMUSCULAR JUNCTION TEST: CPT | Performed by: ORTHOPAEDIC SURGERY

## 2023-04-12 PROCEDURE — 95939 C MOTOR EVOKED UPR&LWR LIMBS: CPT | Performed by: ORTHOPAEDIC SURGERY

## 2023-04-12 PROCEDURE — 700111 HCHG RX REV CODE 636 W/ 250 OVERRIDE (IP): Performed by: PHYSICIAN ASSISTANT

## 2023-04-12 PROCEDURE — A9270 NON-COVERED ITEM OR SERVICE: HCPCS | Performed by: PHYSICIAN ASSISTANT

## 2023-04-12 PROCEDURE — 110371 HCHG SHELL REV 272: Performed by: ORTHOPAEDIC SURGERY

## 2023-04-12 PROCEDURE — 86900 BLOOD TYPING SEROLOGIC ABO: CPT

## 2023-04-12 PROCEDURE — 160035 HCHG PACU - 1ST 60 MINS PHASE I: Performed by: ORTHOPAEDIC SURGERY

## 2023-04-12 PROCEDURE — 700111 HCHG RX REV CODE 636 W/ 250 OVERRIDE (IP): Performed by: ANESTHESIOLOGY

## 2023-04-12 PROCEDURE — 160036 HCHG PACU - EA ADDL 30 MINS PHASE I: Performed by: ORTHOPAEDIC SURGERY

## 2023-04-12 PROCEDURE — 95938 SOMATOSENSORY TESTING: CPT | Performed by: ORTHOPAEDIC SURGERY

## 2023-04-12 PROCEDURE — 700101 HCHG RX REV CODE 250: Performed by: PHYSICIAN ASSISTANT

## 2023-04-12 PROCEDURE — 700102 HCHG RX REV CODE 250 W/ 637 OVERRIDE(OP): Performed by: ANESTHESIOLOGY

## 2023-04-12 PROCEDURE — 72040 X-RAY EXAM NECK SPINE 2-3 VW: CPT

## 2023-04-12 PROCEDURE — 160029 HCHG SURGERY MINUTES - 1ST 30 MINS LEVEL 4: Performed by: ORTHOPAEDIC SURGERY

## 2023-04-12 PROCEDURE — 502240 HCHG MISC OR SUPPLY RC 0272: Performed by: ORTHOPAEDIC SURGERY

## 2023-04-12 PROCEDURE — 0RG20K0 FUSION OF 2 OR MORE CERVICAL VERTEBRAL JOINTS WITH NONAUTOLOGOUS TISSUE SUBSTITUTE, ANTERIOR APPROACH, ANTERIOR COLUMN, OPEN APPROACH: ICD-10-PCS | Performed by: ORTHOPAEDIC SURGERY

## 2023-04-12 PROCEDURE — A9270 NON-COVERED ITEM OR SERVICE: HCPCS | Performed by: ANESTHESIOLOGY

## 2023-04-12 PROCEDURE — 770006 HCHG ROOM/CARE - MED/SURG/GYN SEMI*

## 2023-04-12 PROCEDURE — 86901 BLOOD TYPING SEROLOGIC RH(D): CPT

## 2023-04-12 PROCEDURE — 95861 NEEDLE EMG 2 EXTREMITIES: CPT | Performed by: ORTHOPAEDIC SURGERY

## 2023-04-12 PROCEDURE — 700102 HCHG RX REV CODE 250 W/ 637 OVERRIDE(OP): Performed by: PHYSICIAN ASSISTANT

## 2023-04-12 PROCEDURE — 95865 NEEDLE EMG LARYNX: CPT | Performed by: ORTHOPAEDIC SURGERY

## 2023-04-12 PROCEDURE — 160002 HCHG RECOVERY MINUTES (STAT): Performed by: ORTHOPAEDIC SURGERY

## 2023-04-12 PROCEDURE — 700101 HCHG RX REV CODE 250

## 2023-04-12 PROCEDURE — 700101 HCHG RX REV CODE 250: Performed by: ANESTHESIOLOGY

## 2023-04-12 PROCEDURE — 95868 NDL EMG CRANIAL NRV MUSC BI: CPT | Performed by: ORTHOPAEDIC SURGERY

## 2023-04-12 PROCEDURE — 160048 HCHG OR STATISTICAL LEVEL 1-5: Performed by: ORTHOPAEDIC SURGERY

## 2023-04-12 PROCEDURE — 95955 EEG DURING SURGERY: CPT | Performed by: ORTHOPAEDIC SURGERY

## 2023-04-12 DEVICE — IMPLANTABLE DEVICE: Type: IMPLANTABLE DEVICE | Site: SPINE CERVICAL | Status: FUNCTIONAL

## 2023-04-12 RX ORDER — POLYETHYLENE GLYCOL 3350 17 G/17G
1 POWDER, FOR SOLUTION ORAL 2 TIMES DAILY PRN
Status: DISCONTINUED | OUTPATIENT
Start: 2023-04-12 | End: 2023-04-13 | Stop reason: HOSPADM

## 2023-04-12 RX ORDER — PROCHLORPERAZINE EDISYLATE 5 MG/ML
5-10 INJECTION INTRAMUSCULAR; INTRAVENOUS EVERY 4 HOURS PRN
Status: DISCONTINUED | OUTPATIENT
Start: 2023-04-12 | End: 2023-04-13 | Stop reason: HOSPADM

## 2023-04-12 RX ORDER — PROMETHAZINE HYDROCHLORIDE 25 MG/1
12.5-25 SUPPOSITORY RECTAL EVERY 4 HOURS PRN
Status: DISCONTINUED | OUTPATIENT
Start: 2023-04-12 | End: 2023-04-13 | Stop reason: HOSPADM

## 2023-04-12 RX ORDER — AMOXICILLIN 250 MG
1 CAPSULE ORAL
Status: DISCONTINUED | OUTPATIENT
Start: 2023-04-12 | End: 2023-04-13 | Stop reason: HOSPADM

## 2023-04-12 RX ORDER — ONDANSETRON 4 MG/1
4 TABLET, ORALLY DISINTEGRATING ORAL EVERY 4 HOURS PRN
Status: DISCONTINUED | OUTPATIENT
Start: 2023-04-12 | End: 2023-04-13 | Stop reason: HOSPADM

## 2023-04-12 RX ORDER — GABAPENTIN 300 MG/1
300 CAPSULE ORAL 2 TIMES DAILY
Status: DISCONTINUED | OUTPATIENT
Start: 2023-04-12 | End: 2023-04-13 | Stop reason: HOSPADM

## 2023-04-12 RX ORDER — LIDOCAINE HYDROCHLORIDE 20 MG/ML
INJECTION, SOLUTION EPIDURAL; INFILTRATION; INTRACAUDAL; PERINEURAL PRN
Status: DISCONTINUED | OUTPATIENT
Start: 2023-04-12 | End: 2023-04-12 | Stop reason: SURG

## 2023-04-12 RX ORDER — MEPERIDINE HYDROCHLORIDE 25 MG/ML
6.25 INJECTION INTRAMUSCULAR; INTRAVENOUS; SUBCUTANEOUS
Status: DISCONTINUED | OUTPATIENT
Start: 2023-04-12 | End: 2023-04-12 | Stop reason: HOSPADM

## 2023-04-12 RX ORDER — HYDROMORPHONE HYDROCHLORIDE 1 MG/ML
0.2 INJECTION, SOLUTION INTRAMUSCULAR; INTRAVENOUS; SUBCUTANEOUS
Status: DISCONTINUED | OUTPATIENT
Start: 2023-04-12 | End: 2023-04-12 | Stop reason: HOSPADM

## 2023-04-12 RX ORDER — ENEMA 19; 7 G/133ML; G/133ML
1 ENEMA RECTAL
Status: DISCONTINUED | OUTPATIENT
Start: 2023-04-12 | End: 2023-04-13 | Stop reason: HOSPADM

## 2023-04-12 RX ORDER — DEXTROSE MONOHYDRATE, SODIUM CHLORIDE, AND POTASSIUM CHLORIDE 50; 1.49; 9 G/1000ML; G/1000ML; G/1000ML
INJECTION, SOLUTION INTRAVENOUS CONTINUOUS
Status: DISCONTINUED | OUTPATIENT
Start: 2023-04-12 | End: 2023-04-13 | Stop reason: HOSPADM

## 2023-04-12 RX ORDER — PROMETHAZINE HYDROCHLORIDE 25 MG/1
12.5-25 TABLET ORAL EVERY 4 HOURS PRN
Status: DISCONTINUED | OUTPATIENT
Start: 2023-04-12 | End: 2023-04-13 | Stop reason: HOSPADM

## 2023-04-12 RX ORDER — HYDROMORPHONE HYDROCHLORIDE 1 MG/ML
0.4 INJECTION, SOLUTION INTRAMUSCULAR; INTRAVENOUS; SUBCUTANEOUS
Status: DISCONTINUED | OUTPATIENT
Start: 2023-04-12 | End: 2023-04-12 | Stop reason: HOSPADM

## 2023-04-12 RX ORDER — DOCUSATE SODIUM 100 MG/1
100 CAPSULE, LIQUID FILLED ORAL 2 TIMES DAILY
Status: DISCONTINUED | OUTPATIENT
Start: 2023-04-12 | End: 2023-04-13 | Stop reason: HOSPADM

## 2023-04-12 RX ORDER — DEXAMETHASONE SODIUM PHOSPHATE 4 MG/ML
INJECTION, SOLUTION INTRA-ARTICULAR; INTRALESIONAL; INTRAMUSCULAR; INTRAVENOUS; SOFT TISSUE PRN
Status: DISCONTINUED | OUTPATIENT
Start: 2023-04-12 | End: 2023-04-12 | Stop reason: SURG

## 2023-04-12 RX ORDER — OXYCODONE HCL 5 MG/5 ML
10 SOLUTION, ORAL ORAL
Status: COMPLETED | OUTPATIENT
Start: 2023-04-12 | End: 2023-04-12

## 2023-04-12 RX ORDER — BUPIVACAINE HYDROCHLORIDE AND EPINEPHRINE 5; 5 MG/ML; UG/ML
INJECTION, SOLUTION PERINEURAL
Status: DISCONTINUED | OUTPATIENT
Start: 2023-04-12 | End: 2023-04-12 | Stop reason: HOSPADM

## 2023-04-12 RX ORDER — MORPHINE SULFATE 4 MG/ML
1 INJECTION INTRAVENOUS
Status: DISCONTINUED | OUTPATIENT
Start: 2023-04-12 | End: 2023-04-13 | Stop reason: HOSPADM

## 2023-04-12 RX ORDER — SODIUM CHLORIDE, SODIUM LACTATE, POTASSIUM CHLORIDE, CALCIUM CHLORIDE 600; 310; 30; 20 MG/100ML; MG/100ML; MG/100ML; MG/100ML
INJECTION, SOLUTION INTRAVENOUS CONTINUOUS
Status: DISCONTINUED | OUTPATIENT
Start: 2023-04-12 | End: 2023-04-12 | Stop reason: HOSPADM

## 2023-04-12 RX ORDER — HYDROCODONE BITARTRATE AND ACETAMINOPHEN 5; 325 MG/1; MG/1
1 TABLET ORAL EVERY 4 HOURS PRN
Status: DISCONTINUED | OUTPATIENT
Start: 2023-04-12 | End: 2023-04-13 | Stop reason: HOSPADM

## 2023-04-12 RX ORDER — CEFAZOLIN SODIUM 1 G/3ML
INJECTION, POWDER, FOR SOLUTION INTRAMUSCULAR; INTRAVENOUS PRN
Status: DISCONTINUED | OUTPATIENT
Start: 2023-04-12 | End: 2023-04-12 | Stop reason: SURG

## 2023-04-12 RX ORDER — ONDANSETRON 2 MG/ML
4 INJECTION INTRAMUSCULAR; INTRAVENOUS
Status: DISCONTINUED | OUTPATIENT
Start: 2023-04-12 | End: 2023-04-12 | Stop reason: HOSPADM

## 2023-04-12 RX ORDER — OXYCODONE HCL 5 MG/5 ML
5 SOLUTION, ORAL ORAL
Status: COMPLETED | OUTPATIENT
Start: 2023-04-12 | End: 2023-04-12

## 2023-04-12 RX ORDER — SODIUM CHLORIDE, SODIUM LACTATE, POTASSIUM CHLORIDE, CALCIUM CHLORIDE 600; 310; 30; 20 MG/100ML; MG/100ML; MG/100ML; MG/100ML
INJECTION, SOLUTION INTRAVENOUS CONTINUOUS
Status: DISCONTINUED | OUTPATIENT
Start: 2023-04-12 | End: 2023-04-12

## 2023-04-12 RX ORDER — HYDROMORPHONE HYDROCHLORIDE 1 MG/ML
0.1 INJECTION, SOLUTION INTRAMUSCULAR; INTRAVENOUS; SUBCUTANEOUS
Status: DISCONTINUED | OUTPATIENT
Start: 2023-04-12 | End: 2023-04-12 | Stop reason: HOSPADM

## 2023-04-12 RX ORDER — ONDANSETRON 2 MG/ML
4 INJECTION INTRAMUSCULAR; INTRAVENOUS EVERY 4 HOURS PRN
Status: DISCONTINUED | OUTPATIENT
Start: 2023-04-12 | End: 2023-04-13 | Stop reason: HOSPADM

## 2023-04-12 RX ORDER — DIPHENHYDRAMINE HYDROCHLORIDE 50 MG/ML
12.5 INJECTION INTRAMUSCULAR; INTRAVENOUS
Status: DISCONTINUED | OUTPATIENT
Start: 2023-04-12 | End: 2023-04-12 | Stop reason: HOSPADM

## 2023-04-12 RX ORDER — HYDROMORPHONE HYDROCHLORIDE 2 MG/ML
INJECTION, SOLUTION INTRAMUSCULAR; INTRAVENOUS; SUBCUTANEOUS PRN
Status: DISCONTINUED | OUTPATIENT
Start: 2023-04-12 | End: 2023-04-12 | Stop reason: SURG

## 2023-04-12 RX ORDER — CEFAZOLIN SODIUM 1 G/3ML
INJECTION, POWDER, FOR SOLUTION INTRAMUSCULAR; INTRAVENOUS
Status: DISCONTINUED | OUTPATIENT
Start: 2023-04-12 | End: 2023-04-12 | Stop reason: HOSPADM

## 2023-04-12 RX ORDER — CALCIUM CARBONATE 500 MG/1
500 TABLET, CHEWABLE ORAL 2 TIMES DAILY
Status: DISCONTINUED | OUTPATIENT
Start: 2023-04-12 | End: 2023-04-13 | Stop reason: HOSPADM

## 2023-04-12 RX ORDER — ACETAMINOPHEN 500 MG
1000 TABLET ORAL EVERY 6 HOURS PRN
Status: DISCONTINUED | OUTPATIENT
Start: 2023-04-17 | End: 2023-04-12

## 2023-04-12 RX ORDER — ACETAMINOPHEN 500 MG
1000 TABLET ORAL EVERY 6 HOURS PRN
Status: DISCONTINUED | OUTPATIENT
Start: 2023-04-12 | End: 2023-04-13 | Stop reason: HOSPADM

## 2023-04-12 RX ORDER — LABETALOL HYDROCHLORIDE 5 MG/ML
10 INJECTION, SOLUTION INTRAVENOUS
Status: DISCONTINUED | OUTPATIENT
Start: 2023-04-12 | End: 2023-04-13 | Stop reason: HOSPADM

## 2023-04-12 RX ORDER — AMOXICILLIN 250 MG
1 CAPSULE ORAL NIGHTLY
Status: DISCONTINUED | OUTPATIENT
Start: 2023-04-12 | End: 2023-04-13 | Stop reason: HOSPADM

## 2023-04-12 RX ORDER — ATORVASTATIN CALCIUM 40 MG/1
40 TABLET, FILM COATED ORAL NIGHTLY
Status: DISCONTINUED | OUTPATIENT
Start: 2023-04-12 | End: 2023-04-13 | Stop reason: HOSPADM

## 2023-04-12 RX ORDER — HALOPERIDOL 5 MG/ML
1 INJECTION INTRAMUSCULAR
Status: DISCONTINUED | OUTPATIENT
Start: 2023-04-12 | End: 2023-04-12 | Stop reason: HOSPADM

## 2023-04-12 RX ORDER — BISACODYL 10 MG
10 SUPPOSITORY, RECTAL RECTAL
Status: DISCONTINUED | OUTPATIENT
Start: 2023-04-12 | End: 2023-04-13 | Stop reason: HOSPADM

## 2023-04-12 RX ORDER — HYDRALAZINE HYDROCHLORIDE 20 MG/ML
10 INJECTION INTRAMUSCULAR; INTRAVENOUS
Status: DISCONTINUED | OUTPATIENT
Start: 2023-04-12 | End: 2023-04-13 | Stop reason: HOSPADM

## 2023-04-12 RX ORDER — NICOTINE 21 MG/24HR
1 PATCH, TRANSDERMAL 24 HOURS TRANSDERMAL EVERY 24 HOURS
Status: DISCONTINUED | OUTPATIENT
Start: 2023-04-12 | End: 2023-04-13 | Stop reason: HOSPADM

## 2023-04-12 RX ADMIN — METHOCARBAMOL 1000 MG: 100 INJECTION INTRAMUSCULAR; INTRAVENOUS at 12:40

## 2023-04-12 RX ADMIN — HYDROMORPHONE HYDROCHLORIDE 0.2 MG: 1 INJECTION, SOLUTION INTRAMUSCULAR; INTRAVENOUS; SUBCUTANEOUS at 13:11

## 2023-04-12 RX ADMIN — DOCUSATE SODIUM 100 MG: 100 CAPSULE, LIQUID FILLED ORAL at 17:34

## 2023-04-12 RX ADMIN — HALOPERIDOL LACTATE 1 MG: 5 INJECTION, SOLUTION INTRAMUSCULAR at 13:05

## 2023-04-12 RX ADMIN — POTASSIUM CHLORIDE, DEXTROSE MONOHYDRATE AND SODIUM CHLORIDE: 150; 5; 900 INJECTION, SOLUTION INTRAVENOUS at 14:57

## 2023-04-12 RX ADMIN — ROCURONIUM BROMIDE 50 MG: 10 INJECTION, SOLUTION INTRAVENOUS at 08:05

## 2023-04-12 RX ADMIN — CEFAZOLIN 2 G: 2 INJECTION, POWDER, FOR SOLUTION INTRAMUSCULAR; INTRAVENOUS at 23:13

## 2023-04-12 RX ADMIN — FENTANYL CITRATE 50 MCG: 50 INJECTION, SOLUTION INTRAMUSCULAR; INTRAVENOUS at 12:37

## 2023-04-12 RX ADMIN — HYDROMORPHONE HYDROCHLORIDE 1 MG: 2 INJECTION INTRAMUSCULAR; INTRAVENOUS; SUBCUTANEOUS at 08:05

## 2023-04-12 RX ADMIN — SODIUM CHLORIDE, POTASSIUM CHLORIDE, SODIUM LACTATE AND CALCIUM CHLORIDE: 600; 310; 30; 20 INJECTION, SOLUTION INTRAVENOUS at 07:46

## 2023-04-12 RX ADMIN — DEXAMETHASONE SODIUM PHOSPHATE 10 MG: 4 INJECTION, SOLUTION INTRA-ARTICULAR; INTRALESIONAL; INTRAMUSCULAR; INTRAVENOUS; SOFT TISSUE at 08:46

## 2023-04-12 RX ADMIN — HYDROMORPHONE HYDROCHLORIDE 0.4 MG: 1 INJECTION, SOLUTION INTRAMUSCULAR; INTRAVENOUS; SUBCUTANEOUS at 13:05

## 2023-04-12 RX ADMIN — ATORVASTATIN CALCIUM 40 MG: 40 TABLET, FILM COATED ORAL at 21:28

## 2023-04-12 RX ADMIN — CEFAZOLIN 2 G: 330 INJECTION, POWDER, FOR SOLUTION INTRAMUSCULAR; INTRAVENOUS at 07:50

## 2023-04-12 RX ADMIN — CALCIUM CARBONATE 500 MG: 500 TABLET, CHEWABLE ORAL at 17:34

## 2023-04-12 RX ADMIN — HYDROCODONE BITARTRATE AND ACETAMINOPHEN 1 TABLET: 5; 325 TABLET ORAL at 15:27

## 2023-04-12 RX ADMIN — LIDOCAINE HYDROCHLORIDE 100 MG: 20 INJECTION, SOLUTION EPIDURAL; INFILTRATION; INTRACAUDAL at 07:40

## 2023-04-12 RX ADMIN — SENNOSIDES AND DOCUSATE SODIUM 1 TABLET: 50; 8.6 TABLET ORAL at 21:28

## 2023-04-12 RX ADMIN — FENTANYL CITRATE 50 MCG: 50 INJECTION, SOLUTION INTRAMUSCULAR; INTRAVENOUS at 12:30

## 2023-04-12 RX ADMIN — SODIUM CHLORIDE, POTASSIUM CHLORIDE, SODIUM LACTATE AND CALCIUM CHLORIDE: 600; 310; 30; 20 INJECTION, SOLUTION INTRAVENOUS at 06:56

## 2023-04-12 RX ADMIN — HYDROCODONE BITARTRATE AND ACETAMINOPHEN 1 TABLET: 5; 325 TABLET ORAL at 23:44

## 2023-04-12 RX ADMIN — HYDROMORPHONE HYDROCHLORIDE 1 MG: 2 INJECTION INTRAMUSCULAR; INTRAVENOUS; SUBCUTANEOUS at 07:47

## 2023-04-12 RX ADMIN — CEFAZOLIN 2 G: 2 INJECTION, POWDER, FOR SOLUTION INTRAMUSCULAR; INTRAVENOUS at 15:37

## 2023-04-12 RX ADMIN — Medication 100 MG: at 07:40

## 2023-04-12 RX ADMIN — METHOCARBAMOL 1000 MG: 1000 INJECTION, SOLUTION INTRAMUSCULAR; INTRAVENOUS at 21:29

## 2023-04-12 RX ADMIN — OXYCODONE HYDROCHLORIDE 10 MG: 5 SOLUTION ORAL at 13:13

## 2023-04-12 RX ADMIN — GABAPENTIN 300 MG: 300 CAPSULE ORAL at 17:34

## 2023-04-12 RX ADMIN — HYDROCODONE BITARTRATE AND ACETAMINOPHEN 1 TABLET: 5; 325 TABLET ORAL at 19:30

## 2023-04-12 RX ADMIN — HYDROMORPHONE HYDROCHLORIDE 0.4 MG: 1 INJECTION, SOLUTION INTRAMUSCULAR; INTRAVENOUS; SUBCUTANEOUS at 12:51

## 2023-04-12 RX ADMIN — PROPOFOL 200 MG: 10 INJECTION, EMULSION INTRAVENOUS at 07:40

## 2023-04-12 ASSESSMENT — PAIN DESCRIPTION - PAIN TYPE
TYPE: SURGICAL PAIN
TYPE: ACUTE PAIN
TYPE: SURGICAL PAIN
TYPE: SURGICAL PAIN
TYPE: ACUTE PAIN
TYPE: SURGICAL PAIN
TYPE: SURGICAL PAIN
TYPE: ACUTE PAIN

## 2023-04-12 ASSESSMENT — FIBROSIS 4 INDEX: FIB4 SCORE: 1.151283868320296345

## 2023-04-12 NOTE — OR NURSING
Patient arrived from OR, handoff  completed. Patient placed on monitors with audible alarms. SCD's applied.     Updated patient's brother david via phone.     Report to Ghassan RN, opportunity for questions provided.     Patient with transport to [facility], with one clear patient belongings bag, on 2LNC, oxygen tank full.

## 2023-04-12 NOTE — ANESTHESIA POSTPROCEDURE EVALUATION
Patient: Misael Echevarria    Procedure Summary     Date: 04/12/23 Room / Location: [place] [id number] / SURGERY [facility]    Anesthesia Start: 0730 Anesthesia Stop: 1214    Procedure: ANTERIOR CERVICAL DECOMPRESSION AND FUSION C-4-5, C5-6 AND C6-7 (Spine Cervical) Diagnosis: (CERVICAL STENOSIS, CERVICAL PAIN)    Surgeons: Bob Lynn M.D. Responsible Provider: Thaddeus Vale M.D.    Anesthesia Type: general ASA Status: 3          Final Anesthesia Type: general  Last vitals  BP   Blood Pressure: (!) 151/92    Temp   36.8 °C (98.2 °F)    Pulse   82   Resp   14    SpO2   99 %      Anesthesia Post Evaluation    Patient location during evaluation: PACU  Patient participation: complete - patient participated  Level of consciousness: awake and alert    Airway patency: patent  Anesthetic complications: no  Cardiovascular status: hemodynamically stable  Respiratory status: acceptable  Hydration status: euvolemic    PONV: none          There were no known notable events for this encounter.

## 2023-04-12 NOTE — ANESTHESIA PREPROCEDURE EVALUATION
Case: [id number] Date/Time: 04/12/23 0715    Procedure: ANTERIOR CERVICAL DECOMPRESSION AND FUSION C-4-5, C5-6 AND C6-7    Pre-op diagnosis: CERVICAL STENOSIS, CERVICAL PAIN    Location: [facility] / SURGERY [facility]    Surgeons: Bob Lynn M.D.          Relevant Problems   CARDIAC   (positive) Atherosclerosis of both carotid arteries         (positive) Chronic viral hepatitis B without delta agent and without coma (HCC)       Physical Exam    Airway   Mallampati: II  TM distance: >3 FB  Neck ROM: full       Cardiovascular - normal exam  Rhythm: regular  Rate: normal  (-) murmur     Dental - normal exam           Pulmonary - normal exam  Breath sounds clear to auscultation     Abdominal    Neurological - normal exam                 Anesthesia Plan    ASA 3   ASA physical status 3 criteria: COPD - poorly controlled    Plan - general       Airway plan will be ETT          Induction: intravenous    Postoperative Plan: Postoperative administration of opioids is intended.    Pertinent diagnostic labs and testing reviewed    Informed Consent:    Anesthetic plan and risks discussed with patient.    Use of blood products discussed with: patient whom consented to blood products.

## 2023-04-12 NOTE — OR SURGEON
ORTHO SPINE Immediate Post OP Note  [facility] [place]          PreOp Diagnosis:     1.  Chronic axial neck pain  2.  Cervical degenerative disc disease C4-C5, C5-C6 and C6-C7  3.  Cervical foraminal stenosis  4.  Chronic right upper extremity pain consistent with cervical radiculopathy  5.  Work-related injury January 7, 2021  6.  Right shoulder arthroscopic subacromial decompression February 13, 2022  7.  Right shoulder manipulation under anesthesia      PostOp Diagnosis:     1.  Chronic axial neck pain  2.  Cervical degenerative disc disease C4-C5, C5-C6 and C6-C7  3.  Cervical foraminal stenosis  4.  Chronic right upper extremity pain consistent with cervical radiculopathy  5.  Work-related injury January 7, 2021  6.  Right shoulder arthroscopic subacromial decompression February 13, 2022  7.  Right shoulder manipulation under anesthesia    Procedure(s):  ANTERIOR CERVICAL DECOMPRESSION AND FUSION C-4-5, C5-6 AND C6-7 - Wound Class: Clean with Drain    Surgeon(s):  Bob Lynn M.D.    Anesthesiologist/Type of Anesthesia:  Anesthesiologist: Thaddeus Vale M.D./General    Surgical Staff:  Assistant: JACKELINE Mccormack  Circulator: Fina Mckoy R.N.  Scrub Person: Rudy Alcala  Radiology Technologist: Josee Pagan    Specimens removed if any:  * No specimens in log *    Estimated Blood Loss: 50 cc    Findings:  large anterior osteophytes and degeneration at the disc space    Complications: NONE        4/12/2023 11:47 AM Bob Lynn M.D.

## 2023-04-12 NOTE — ANESTHESIA PROCEDURE NOTES
Airway    Date/Time: 4/12/2023 7:41 AM  Performed by: Thaddeus Vale M.D.  Authorized by: Thaddeus Vale M.D.     Location:  OR  Urgency:  Elective  Indications for Airway Management:  Anesthesia      Spontaneous Ventilation: absent    Sedation Level:  Deep  Preoxygenated: Yes    Patient Position:  Sniffing  Final Airway Type:  Endotracheal airway  Final Endotracheal Airway:  ETT  Cuffed: Yes    Technique Used for Successful ETT Placement:  Direct laryngoscopy    Insertion Site:  Oral  Blade Type:  Sea  Laryngoscope Blade/Videolaryngoscope Blade Size:  4  ETT Size (mm):  8.0  Measured from:  Teeth  ETT to Teeth (cm):  22  Placement Verified by: auscultation and capnometry    Cormack-Lehane Classification:  Grade I - full view of glottis  Number of Attempts at Approach:  1

## 2023-04-12 NOTE — OP REPORT
ORTHO SPINE OPERATIVE REPORT  [facility] [place]          PREOPERATIVE DIAGNOSIS:    1.  Chronic axial neck pain  2.  Cervical degenerative disc disease C4-C5, C5-C6 and C6-C7  3.  Cervical foraminal stenosis  4.  Chronic right upper extremity pain consistent with cervical radiculopathy  5.  Work-related injury January 7, 2021  6.  Right shoulder arthroscopic subacromial decompression February 13, 2022  7.  Right shoulder manipulation under anesthesia    POSTOPERATIVE DIAGNOSIS:    1.  Chronic axial neck pain  2.  Cervical degenerative disc disease C4-C5, C5-C6 and C6-C7  3.  Cervical foraminal stenosis  4.  Chronic right upper extremity pain consistent with cervical radiculopathy  5.  Work-related injury January 7, 2021  6.  Right shoulder arthroscopic subacromial decompression February 13, 2022  7.  Right shoulder manipulation under anesthesia    PROCEDURE:     1.  C5-C6, C4-C5 and C6-C7 anterior cervical discectomy fusion with instrumentation and allograft.    SURGEON:      Bob Lynn MD    ASSISTANT:     Ramakrishna ACEVEDO    ANESTHESIA:   Total Intravenous Anesthesia    ESTIMATED BLOOD LOSS:   Less than 50 cc    INTRAVENOUS FLUIDS:     2200 cc Lactated Ringers    COMPLICATIONS:   NONE    INTRAOPERATIVE SPINAL CORD MONITORING:     SSEP's   MEP's    IMPLANTS:       ZAVATION EZ PLATE SIZE 51 mm     ZAVATION FIXED ANGLE SCREWS SIZE 16 mm     9 mm ALLOGRAFT BONE    INDICATIONS:    Mr. Echevarria is a 57-year-old gentleman with a past medical history significant for a right shoulder subacromial decompression with distal clavicle resection on February 13, 2022.  The patient was taken back to the operating room and underwent a manipulation of the right shoulder under anesthesia due to a frozen shoulder.    The patient has had right knee surgery.  He has had a hernia repair as well as an appendectomy and history of opioid addiction.    This patient sustained a work-related injury that occurred on  January 7, 2021.  The patient slipped and fell on the ice.  He came down on his right side.    The patient was seen and evaluated at [facility] approximately 5 days after his injury.    Ultimately the patient had an MRI of his cervical spine performed in May 2021.    The patient had been seen and evaluated by Dr. Misael Etienne.    The patient had undergone an interlaminar epidural steroid injection on November 12, 2021.  He had another C7-T1 interlaminar epidural steroid injection performed on June 17, 2022 as well as a C7-T1 interlaminar injection provided on July 16, 2021.    The patient has had multiple trigger point injections.    He has participated in physical therapy.  This is included traction.  He has undergone dry needling as well as electrolysis.    The patient did have a history of smoking.  He has quit smoking approximately 4 weeks ago.    The patient continues to complain of axial neck pain.  He continues to complain of pain radiating into the right upper extremity.    The patient had an MRI of the cervical spine as well as a CT scan of the cervical spine and plain radiographs.  The patient was found to have multilevel cervical degenerative disc disease with cervical facet arthropathy and varying degrees of cervical foraminal stenosis at multiple levels.    Patient failed to see any significant relief with the conservative measures that were rendered.  Ultimately the patient met surgical indications.  The risk, the benefits the alternatives of operative treatment were discussed on multiple occasions with this patient.  We discussed realistic expectations.  We discussed the risk of adjacent segment degenerative changes requiring further surgery in the future.  We discussed the risk of posterior instrumented fusion being required for pseudoarthrosis.  We discussed the pseudoarthrosis possibility.  We discussed incomplete relief of his pain.  All of which she voiced understanding to.  All questions  were answered.  He wished to proceed.    Signed consent was obtained.      OPERATIVE TECHNIQUE:    The patient was taken to the operative suite.  General anesthesia was induced via an endotracheal tube.  The patient was then switched to total intravenous anesthesia.    The intraoperative spinal cord monitoring technician set the patient up for baselines to be obtained prior to positioning.    This was achieved while the patient was supine on the Amsler table.    Bony prominences were well-padded using a crate foam.  Bilateral PAS boots were utilized and operational throughout the case.  A nonsterile bump was placed in between the scapula.  His shoulders were gently taped to the foot of the bed.    His cervical spine was placed in a position of extension.  Care was taken to not over extend his cervical spine.    His anterior cervical spine was then squared out using thousand drapes.  The anterior cervical spine was then cleansed thoroughly using rubbing alcohol.  The anterior cervical spine was then cleansed thoroughly using Betadine paint.  A full Betadine scrub was then performed.  This followed by DuraPrep and Ioban.    Patient did receive 2 g of Ancef intravenously at the start of the case.  He also received Decadron 10 mg at the start of the case.    A timeout was called.    A slightly L. Bleich incision was created on the left side using a 10 blade scalpel.  Sharp dissection was carried down through the dermis.  Hemostasis controlled using Bovie cautery.  Subcutaneous tissues were dissected in line with the skin incision down to the platysma muscle fascia.  The platysma muscle and the fascia were split in line with the muscle fibers.  Blunt dissection was carried down to the anterior longitudinal ligament.    Fluoroscopy was draped in a sterile fashion.  There is significant large bony osteophytes off the anterior aspect of the vertebral bodies.  This did make it slightly more challenging to determine the disc  space level.    A [place] pin was placed at the anticipated level of the construct.  An intraoperative x-ray was obtained to confirm proper localization.    Based on our interpretation this [place] pin was at the C5 vertebral body.  A second [place] pin was placed at the C4 vertebral body.    A self distraction device was placed across the [place] pins.    A 15 blade scalpel on the long handle was then used to create an annulotomy of C4-C5.  A pituitary rongeur was then used to remove the majority of the disc material at this level.    A large straight curette from the carmine instrumentation set was then used to take down the cartilaginous endplate inferiorly at C4 and superiorly at C5.    The discectomy proceeded back to the bony osteophytes posteriorly.  The bony osteophytes were taken down with a Midas Ricky bur with an AM 8 bit.  The decompression proceeded down to the dura.  Hemostasis was controlled using bipolar cautery and Gelfoam with thrombin.    We then performed distraction at the disc base using 9 mm and 10 mm as well as 8 mm trials from the allograft set.    Ultimately we settled on a size 9 mm piece of bony allograft bone.  This was inserted into the disc base with difficulties.    Attention was then directed at the C5-C6 disc space.  The [place] pin was removed from the C4 vertebral body.  This [place] pin was placed at the C6 vertebral body.  The resultant hole at C4 was then filled with bone wax to maintain hemostasis.    In the same fashion that we prepared the C4-C5 disc we did the same and C5-C6.  We utilized a self distraction device across the [place] pins.  We performed a complete discectomy.  Bony osteophytes were taken down using the Midas Ricky bur with an AM 8 bit.  The decompression proceeded down to the dura.  Foraminotomies were performed.  Hemostasis controlled using bipolar cautery and Gelfoam with thrombin.    Ultimately we settled on a 9 mm piece of the lordotic allograft bone.  This was  placed within the disc space.    We then turned our attention to the final disc base that being C6-C7.  In the same fashion we had prepared the previous 2 we did the same and C6-C7.  Ultimately settling on a size 9 mm piece of allograft bone.    We then turned our attention to placing the instrumentation.  We secured a 51 mm anterior cervical plate ZAVATION EZ PLATE.  This plate was locked to the C4, C5, C6 and C7 vertebral bodies using 16 mm fixed angle screws.  The plate was locked to the screws according to the technique guide provided by the .    A intraoperative x-ray in both AP and lateral were secured.  We had significant difficulty looking at the caudal aspect of the construct secondary to the patient's large shoulders.  I was very reluctant to provide traction to the upper extremities based on his previous shoulder surgery and previous shoulder issues.    I felt confident that we had our instrumentation in good position.  I felt confident that the allograft bone was in good position.    The wound was then irrigated with copious amounts normal saline.    A medium Hemovac drain was then placed deep to the platysma muscle fascia juxtaposed next of the instrumentation.    The wound was then closed in layered fashion using 3-0 undyed Vicryl and 3-0 Monocryl.  A sterile dressing consisting of benzoin both Steri-Strips and fluffs was then placed.  The patient tolerated procedure well.  There were no complications.  Patient was expelled without difficulty and taken to recovery room in stable condition.    At no time during the procedure was there any abnormal readings from the intraoperative monitoring.        Bob Lynn MD

## 2023-04-12 NOTE — ANESTHESIA TIME REPORT
Anesthesia Start and Stop Event Times     Date Time Event    4/12/2023 0730 Anesthesia Start     0746 Ready for Procedure     1214 Anesthesia Stop        Responsible Staff  04/12/23    Name Role Begin End    Thaddeus Vale M.D. Anesth 0730 1214        Overtime Reason:  overtime    Comments:

## 2023-04-13 VITALS
DIASTOLIC BLOOD PRESSURE: 80 MMHG | RESPIRATION RATE: 18 BRPM | WEIGHT: 215.17 LBS | TEMPERATURE: 97.8 F | HEIGHT: 70 IN | HEART RATE: 63 BPM | BODY MASS INDEX: 30.8 KG/M2 | SYSTOLIC BLOOD PRESSURE: 145 MMHG | OXYGEN SATURATION: 93 %

## 2023-04-13 PROCEDURE — A9270 NON-COVERED ITEM OR SERVICE: HCPCS | Performed by: PHYSICIAN ASSISTANT

## 2023-04-13 PROCEDURE — 97166 OT EVAL MOD COMPLEX 45 MIN: CPT

## 2023-04-13 PROCEDURE — 700102 HCHG RX REV CODE 250 W/ 637 OVERRIDE(OP): Performed by: PHYSICIAN ASSISTANT

## 2023-04-13 PROCEDURE — 97162 PT EVAL MOD COMPLEX 30 MIN: CPT

## 2023-04-13 PROCEDURE — 700101 HCHG RX REV CODE 250: Performed by: PHYSICIAN ASSISTANT

## 2023-04-13 PROCEDURE — 700111 HCHG RX REV CODE 636 W/ 250 OVERRIDE (IP): Performed by: PHYSICIAN ASSISTANT

## 2023-04-13 RX ORDER — HYDROCODONE BITARTRATE AND ACETAMINOPHEN 5; 325 MG/1; MG/1
1 TABLET ORAL EVERY 6 HOURS PRN
Qty: 20 TABLET | Refills: 0 | Status: SHIPPED | OUTPATIENT
Start: 2023-04-13 | End: 2023-04-18

## 2023-04-13 RX ADMIN — NICOTINE 14 MG: 14 PATCH TRANSDERMAL at 05:57

## 2023-04-13 RX ADMIN — POTASSIUM CHLORIDE, DEXTROSE MONOHYDRATE AND SODIUM CHLORIDE 1000 ML: 150; 5; 900 INJECTION, SOLUTION INTRAVENOUS at 03:53

## 2023-04-13 RX ADMIN — HYDROCODONE BITARTRATE AND ACETAMINOPHEN 1 TABLET: 5; 325 TABLET ORAL at 11:12

## 2023-04-13 RX ADMIN — HYDROCODONE BITARTRATE AND ACETAMINOPHEN 1 TABLET: 5; 325 TABLET ORAL at 03:44

## 2023-04-13 RX ADMIN — CALCIUM CARBONATE 500 MG: 500 TABLET, CHEWABLE ORAL at 05:57

## 2023-04-13 RX ADMIN — MORPHINE SULFATE 1 MG: 4 INJECTION INTRAVENOUS at 10:30

## 2023-04-13 RX ADMIN — MORPHINE SULFATE 1 MG: 4 INJECTION INTRAVENOUS at 06:11

## 2023-04-13 RX ADMIN — HYDROCODONE BITARTRATE AND ACETAMINOPHEN 1 TABLET: 5; 325 TABLET ORAL at 15:54

## 2023-04-13 RX ADMIN — DOCUSATE SODIUM 100 MG: 100 CAPSULE, LIQUID FILLED ORAL at 05:53

## 2023-04-13 RX ADMIN — GABAPENTIN 300 MG: 300 CAPSULE ORAL at 06:00

## 2023-04-13 ASSESSMENT — COGNITIVE AND FUNCTIONAL STATUS - GENERAL
STANDING UP FROM CHAIR USING ARMS: A LITTLE
SUGGESTED CMS G CODE MODIFIER DAILY ACTIVITY: CH
MOBILITY SCORE: 21
SUGGESTED CMS G CODE MODIFIER MOBILITY: CJ
WALKING IN HOSPITAL ROOM: A LITTLE
CLIMB 3 TO 5 STEPS WITH RAILING: A LITTLE
DAILY ACTIVITIY SCORE: 24

## 2023-04-13 ASSESSMENT — PAIN DESCRIPTION - PAIN TYPE
TYPE: SURGICAL PAIN
TYPE: ACUTE PAIN
TYPE: SURGICAL PAIN
TYPE: ACUTE PAIN

## 2023-04-13 ASSESSMENT — GAIT ASSESSMENTS
DEVIATION: NO DEVIATION
DISTANCE (FEET): 510
GAIT LEVEL OF ASSIST: STANDBY ASSIST

## 2023-04-13 ASSESSMENT — ACTIVITIES OF DAILY LIVING (ADL): TOILETING: INDEPENDENT

## 2023-04-13 NOTE — CARE PLAN
Pt reports feeling better and no longer nauseated.       Haile Parekh RN  07/18/21 3611 The patient is Watcher - Medium risk of patient condition declining or worsening    Shift Goals  Clinical Goals: pain control, neuro exam  Patient Goals: pain control  Family Goals: feels better    Progress made toward(s) clinical / shift goals:    Problem: Pain - Standard  Goal: Alleviation of pain or a reduction in pain to the patient’s comfort goal  Outcome: Progressing     Problem: Knowledge Deficit - Standard  Goal: Patient and family/care givers will demonstrate understanding of plan of care, disease process/condition, diagnostic tests and medications  Outcome: Progressing       Patient is not progressing towards the following goals:

## 2023-04-13 NOTE — DISCHARGE PLANNING
Case Management Discharge Planning    Admission Date: 4/12/2023  GMLOS: 1.7  ALOS: 1    6-Clicks ADL Score:    6-Clicks Mobility Score: 21      Anticipated Discharge Dispo: Discharge Disposition: Discharged to home/self care (01)  Discharge Address: [facility] [address] [place] [address]  Discharge Contact Phone Number: [phone]    DME Needed: No    Action(s) Taken: CM met at bedside with patient to complete assessment and discuss DCP.  Patient, AAOx4 at time of interview, reported residing with his two brothers in a single level house with 5 steps to entryway.  Prior to admission patient was independent with ADLs, driving, and ambulating w/o assistive devices.  He denies any DME use and was not active with [facility].  Patient is currently unemployed, receiving workers comp disability of $1200/bi-weekly.  Patient requested assistance with MJ screening/application.    Return transportation to home will be provided by one of his siblings.    No anticipated CM DC needs identified at this time.    Escalations Completed: Email sent to [facility] requesting assistance with MJ screening.    Medically Clear: No    Next Steps: CM remains available for assistance with DCP    Barriers to Discharge: Medical clearance    Is the patient up for discharge tomorrow:   Anticipating DC Home/Self Care this afternoon.      Care Transition Team Assessment    Information Source  Orientation Level: Oriented X4  Information Given By: Patient  Who is responsible for making decisions for patient? : Patient    Readmission Evaluation  Is this a readmission?: No    Elopement Risk  Legal Hold: No  Ambulatory or Self Mobile in Wheelchair: Yes  Disoriented: No  Psychiatric Symptoms: None  History of Wandering: No  Elopement this Admit: No  Vocalizing Wanting to Leave: No  Displays Behaviors, Body Language Wanting to Leave: No-Not at Risk for Elopement  Elopement Risk: Not at Risk for Elopement    Interdisciplinary Discharge Planning  Lives with - Patient's  Self Care Capacity: Sibling (2 brothers)  Housing / Facility: [address] [facility]  Prior Services: Home-Independent    Discharge Preparedness  What is your plan after discharge?: Home with help  What are your discharge supports?: Sibling  Prior Functional Level: Ambulatory, Drives Self, Independent with Activities of Daily Living    Functional Assesment  Prior Functional Level: Ambulatory, Drives Self, Independent with Activities of Daily Living    Finances  Financial Barriers to Discharge: No  Prescription Coverage: No  Prescription Coverage Comments: Workers Compensation; No other insurance     Advance Directive  Advance Directive?: None  Advance Directive offered?: AD Booklet given    Domestic Abuse  Physical Abuse or Sexual Abuse: No  Verbal Abuse or Emotional Abuse: No    Psychological Assessment  History of Substance Abuse: Methamphetamine  Date Last Used - Methamphetamine: 2006  History of Psychiatric Problems: No  Non-compliant with Treatment: No  Newly Diagnosed Illness: No     Anticipated Discharge Information  Discharge Disposition: Discharged to home/self care (01)  Discharge Address: [address] [place] [address] [address] [place] [address]  Discharge Contact Phone Number: [phone]

## 2023-04-13 NOTE — PROGRESS NOTES
4 Eyes Skin Assessment Completed by ANALISA Ferrell and ANALISA Khan.    Head WDL  Ears WDL  Nose WDL  Mouth WDL  Neck Incision covered in dressing  Breast/Chest WDL  Shoulder Blades WDL  R post shoulder dressing red but blanching  Spine WDL  (R) Arm/Elbow/Hand WDL  (L) Arm/Elbow/Hand WDL  Abdomen WDL  Groin WDL  Scrotum/Coccyx/Buttocks WDL  (R) Leg WDL  (L) Leg WDL  (R) Heel/Foot/Toe WDL  (L) Heel/Foot/Toe WDL          Devices In Places SCD's      Interventions In Place Pillows, Q2 Turns, and Heels Loaded W/Pillows    Possible Skin Injury No    Pictures Uploaded Into Epic N/A  Wound Consult Placed N/A  RN Wound Prevention Protocol Ordered No

## 2023-04-13 NOTE — DISCHARGE INSTRUCTIONS
Patient knows to keep incision clean and dry.  Discharge Instructions    Discharged to home by car with relative. Discharged via wheelchair, hospital escort: Yes.  Special equipment needed: Not Applicable    Be sure to schedule a follow-up appointment with your primary care doctor or any specialists as instructed.     Discharge Plan:   Diet Plan: Discussed  Activity Level: Discussed  Confirmed Follow up Appointment: Patient to Call and Schedule Appointment  Confirmed Symptoms Management: Discussed    I understand that a diet low in cholesterol, fat, and sodium is recommended for good health. Unless I have been given specific instructions below for another diet, I accept this instruction as my diet prescription.   Other diet: resume regular diet    Special Instructions: None    -Is this patient being discharged with medication to prevent blood clots?  No    Is patient discharged on Warfarin / Coumadin?   No

## 2023-04-13 NOTE — PROGRESS NOTES
ORTHO SPINE Progress Note  [facility] [place]        Date of Service: 2023    Chief Complaint    57 y.o. male admitted 2023 with intractable axial neck pain.  He had pain radiating into the right shoulder.    The patient was taken to the operative suite yesterday for an elective C4-C7 anterior cervical discectomy fusion with instrumentation and allograft.    Patient did very well through surgery.  There were no complications.    Patient has been recovering on the first floor of [facility] in room 190.    Patient states that he has been up several times walking to the bathroom as well as walking in the halls.    Patient did eat a dinner last night.  This consisted of soup and other items.    The patient is swallowing his pills.  He reports there is a little bit of soreness.  However he has no significant complaints with regard to his swallowing ability.    The patient states that he feels as though he will need to have a bowel movement this morning.    He reports flatus.    Pain is well controlled.    He reports the pain he had in his right shoulder is now gone since surgery.  He is very pleased.    The Hemovac has been emptied twice.  The first time patient reports it was approximately 10 cc.  The second emptying was approximately 15 cc.    Interval Problem Update  Improved    Consultants/Specialty  None    Disposition  Good        ROS   Physical Exam  Laboratory/Imaging   Hemodynamics  Temp (24hrs), Av.9 °C (98.4 °F), Min:36.4 °C (97.6 °F), Max:37.7 °C (99.8 °F)   Temperature: 36.4 °C (97.6 °F)  Pulse  Av.9  Min: 63  Max: 87    Blood Pressure: (!) 149/76 (Rn notified )      Respiratory      Respiration: 20 (Pat said he is in pain ), Pulse Oximetry: 91 %        RUL Breath Sounds: Clear, RML Breath Sounds: Clear, RLL Breath Sounds: Diminished, NURIA Breath Sounds: Clear, LLL Breath Sounds: Diminished    Fluids    Intake/Output Summary (Last 24 hours) at 2023 8766  Last data filed  at 4/13/2023 0400  Gross per 24 hour   Intake 2450 ml   Output 2765 ml   Net -315 ml       Nutrition  Orders Placed This Encounter   Procedures    Diet Order Diet: Full Liquid     Standing Status:   Standing     Number of Occurrences:   1     Order Specific Question:   Diet:     Answer:   Full Liquid [11]     Physical Exam    General: Patient is a pleasant 57-year-old gentleman who appears stated age.  He is laying in his hospital bed.  Head of bed is elevated approximately 70 degrees.  He appears to be in no distress.    Mental status: Patient is alert and oriented x4.    Lungs: Bilateral chest expansion.  He has no active cough.  He is not labored in his breathing.  His respiratory rate appears normal.    Abdomen: Soft nontender nondistended.  Positive bowel sounds.    Extremities:    Patient is demonstrating good strength in the upper extremities and lower extremities.    Calves are soft nontender.    Neck:    Soft and supple.  Skin incision is well approximated.  There is no significant drainage.    Hemovac is holding suction.  There is scant drainage in the canister.    His voice sounds strong.    He is swallowing his oral secretions.  He is able to complete sentences without taking breaths.                               Assessment/Plan     No new Assessment & Plan notes have been filed under this hospital service since the last note was generated.  Service: Surgery Orthopedic    1.  C4-C7 anterior cervical discectomy fusion with instrumentation and allograft.  POD#1  2.  Chronic right shoulder pain improved  3.  Chronic axial neck pain improved  4.  History of narcotic addiction  5.  Right shoulder arthroscopic subacromial decompression February 13, 2021  6.  Right shoulder manipulation under general anesthesia  7.  Work-related injury January 7, 2021    Patient is doing well.    We will discharge him after lunch sometime.    He is to get up and mobilize.  He is to walk in the halls.  He is to eat his  breakfast.    Today we had a long discussion with regard to the use of narcotic pain medication.  He has a history of addiction.  We discussed the risk benefits and alternatives in regards to using this medication.  He understands to use it as little as possible.    The patient has my personal cell phone if he has any questions or concerns.  Otherwise Malgorzata my nurse will be calling him tomorrow to check in on his progress and how he is doing.  Answer any questions.    He was instructed on proper care of the incision.  He understands to keep the incision completely dry for the next 7 days.    The dressing will be changed prior to his discharge today.  The Hemovac will be removed prior to his discharge today.  He is to remove the dressing that will be placed today tomorrow.  He is then to leave the incision open to air.    All questions were answered.        Bob Lynn MD        Quality[employer]asures

## 2023-04-13 NOTE — PROGRESS NOTES
Patient discharged, brother at bedside. Hemovac removed, site dressed. All questions and concerns addressed.

## 2023-04-13 NOTE — THERAPY
Occupational Therapy   Initial Evaluation     Patient Name: Misael Echevarria  Age:  57 y.o., Sex:  male  Medical Record #: [id number]  Today's Date: 4/13/2023     Precautions  Precautions: (P) Spinal / Back Precautions     Assessment    Patient is 57 y.o. male seen s/p ACDF 4/12 C4-7. PMHx includes hypercholesterolemia, R shoulder arthroscopy with subacromial decompression, R knee surgery, Hep C, Hep B, opiod abuse. Pt normally independent with functional mobility and ADLs living in a [facility] with two brothers who are able to assist as needed. Pt able to complete functional mobility and ADLs with supervision, no AD required. Pt educated on spinal precautions and adaptive strategies for full body dressing. No acute OT needs identified will complete order at this time.     Plan    DC Equipment Recommendations: (P) None  Discharge Recommendations: (P) Anticipate that the patient will have no further occupational therapy needs after discharge from the hospital     Objective       04/13/23 0844   Prior Living Situation   Prior Services Home-Independent   Housing / Facility 1 Story House   Steps Into Home 5   Steps In Home 0   Rail Left Rail  (Steps into Home)   Bathroom Set up Bathtub / Shower Combination   Equipment Owned Single Point Cane   Lives with - Patient's Self Care Capacity Sibling   Prior Level of ADL Function   Self Feeding Independent   Grooming / Hygiene Independent   Bathing Independent   Dressing Independent   Toileting Independent   Prior Level of IADL Function   Medication Management Independent   Laundry Independent   Kitchen Mobility Independent   Finances Independent   Home Management Independent   Shopping Independent   Prior Level Of Mobility Independent Without Device in Community   Driving / Transportation Driving Independent   Occupation (Pre-Hospital Vocational) Not Employed   History of Falls   History of Falls No   Precautions   Precautions Spinal / Back Precautions    Pain 0 - 10 Group   Therapist  Pain Assessment Post Activity Pain Same as Prior to Activity;Nurse Notified   Cognition    Cognition / Consciousness WDL   Level of Consciousness Alert   Active ROM Upper Body   Active ROM Upper Body  WDL   Dominant Hand Right   Strength Upper Body   Upper Body Strength  WDL   Sensation Upper Body   Upper Extremity Sensation  WDL   Upper Body Muscle Tone   Upper Body Muscle Tone  WDL   Neurological Concerns   Neurological Concerns No   Coordination Upper Body   Coordination WDL   Balance Assessment   Sitting Balance (Static) Good   Sitting Balance (Dynamic) Fair +   Standing Balance (Static) Fair   Standing Balance (Dynamic) Fair   Weight Shift Sitting Good   Weight Shift Standing Good   Comments no AD   Bed Mobility    Supine to Sit Standby Assist   ADL Assessment   Grooming Supervision   Upper Body Dressing Supervision   Lower Body Dressing Supervision   How much help from another person does the patient currently need...   Putting on and taking off regular lower body clothing? 4   Bathing (including washing, rinsing, and drying)? 4   Toileting, which includes using a toilet, bedpan, or urinal? 4   Putting on and taking off regular upper body clothing? 4   Taking care of personal grooming such as brushing teeth? 4   Eating meals? 4   6 Clicks Daily Activity Score 24   Functional Mobility   Sit to Stand Standby Assist   Bed, Chair, Wheelchair Transfer Standby Assist   Transfer Method Stand Step   Mobility bed mobility, hallway mobility, up to chair   Comments no AD   Activity Tolerance   Sitting in Chair left seated in chair   Sitting Edge of Bed 2 min   Standing 10 min   Education Group   Education Provided Role of Occupational Therapist;Spinal Precautions   Role of Occupational Therapist Patient Response Patient;Acceptance;Explanation   Spinal Precautions Patient Response Patient;Acceptance;Explanation;Action Demonstration;Handout   Problem List   Problem List None   Anticipated Discharge Equipment and  Recommendations   DC Equipment Recommendations None   Discharge Recommendations Anticipate that the patient will have no further occupational therapy needs after discharge from the hospital   Interdisciplinary Plan of Care Collaboration   IDT Collaboration with  Nursing;Physical Therapist   Patient Position at End of Therapy Seated;Chair Alarm On;Tray Table within Reach;Call Light within Reach;Phone within Reach   Collaboration Comments RN updated

## 2023-04-13 NOTE — CARE PLAN
The patient is Stable - Low risk of patient condition declining or worsening    Shift Goals  Clinical Goals: Pain management; no complications related to spinal cord injury/surgery; Ambulate around the unit  Patient Goals: pain control; Increase mobility/ambulate around unit;   Family Goals: feels better    Progress made toward(s) clinical / shift goals:  Patient remains alert and oriented X 4. Neuro checks continued with no new complications observed. Patient continues IV D5NS with 20meq potassium, as ordered. Patient continues pain management, as ordered. No signs of distress have been observed. Patient no longer requiring oxygen to maintain SpO2 aove 92%.   Patient continues to ambulate safely, with staff.      Problem: Pain - Standard  Goal: Alleviation of pain or a reduction in pain to the patient’s comfort goal  Outcome: Progressing     Problem: Knowledge Deficit - Standard  Goal: Patient and family/care givers will demonstrate understanding of plan of care, disease process/condition, diagnostic tests and medications  Outcome: Progressing     Problem: Fall Risk  Goal: Patient will remain free from falls  Outcome: Progressing     Problem: Cervical Spine Surgery  Goal: Post-Operative Cervical Spine Surgery: Patient will achieve optimal post-surgical outcomes  Outcome: Progressing     Problem: Knowledge Deficit - Neuro Surgical  Goal: Patient's understanding of spinal precautions, appropriate body mechanics and incision care will improve  Outcome: Progressing     Problem: Respiratory/Oxygenation Function Post-Surgical  Goal: Patient will achieve/maintain normal respiratory rate/effort  Outcome: Progressing     Problem: Early Mobilization - Post Surgery  Goal: Early mobilization post surgery  Outcome: Progressing     Problem: Neurovascular Monitoring  Goal: Patient's neurovascular status will be maintained or improve  Outcome: Progressing     Problem: Bowel Elimination - Post Surgical  Goal: Patient will resume  regular bowel sounds and function with no discomfort or distention  Outcome: Progressing     Problem: Pain - Post Surgery  Goal: Alleviation or reduction of pain post surgery  Outcome: Progressing  Goal: Proper management of On-Q Pump  Outcome: Progressing     Problem: Incision Care  Goal: Optimal post surgical incision care  Outcome: Progressing     Problem: Risk for Post Op Fluid Imbalance  Goal: Promotion of fluid balance  Outcome: Progressing       Patient is not progressing towards the following goals:

## 2023-04-13 NOTE — THERAPY
"Physical Therapy   Initial Evaluation     Patient Name: Misael Echevarria  Age:  57 y.o., Sex:  male  Medical Record #: [id number]  Today's Date: 4/13/2023     Precautions  Precautions: (P) Spinal / Back Precautions     Assessment    Patient is 57 y.o. male s/p ACDF 4/12 C4-7. PMHx includes hypercholesterolemia, R shoulder arthroscopy with subacromial decompression, R knee surgery, Hep C, Hep B, opiod abuse.     Pt is agreeable to participation in therapy session and tolerates session well; pain in neck 2/10 at rest does not increase/change with mobility. Pt demonstrates adequate safety awareness and pacing during functional mobility including bed mobility, transfers to chair/standing and ambulation in hallway without device. Pt completes 5 stairs up/down with minimal use of railings. Spinal precautions reviewed with pt, handout given, pt demonstrates good understanding and compliance potential.     Pt does not require further acute PT services at this time, is appropriate for DC home with support of two brothers when medically cleared. No DME needs at this time, no post acute needs.           Plan    Physical Therapy Initial Treatment Plan   Duration: (P) Evaluation only    DC Equipment Recommendations: (P) None  Discharge Recommendations: (P) Anticipate that the patient will have no further physical therapy needs after discharge from the hospital       Subjective    \"My job right now is to listen to what you guys tell me and then do it.\"      Objective       04/13/23 0841   Charge Group   PT Evaluation PT Evaluation Mod   Total Time Spent   PT Total Time Yes   PT Evaluation Time Spent (Mins) 17    Services   Is patient using  services for this encounter? No   Initial Contact Note    Initial Contact Note Order Received and Verified, Evaluation Only - Patient Does Not Require Further Acute Physical Therapy at this Time.  However, May Benefit from Post Acute Therapy for Higher Level Functional " Deficits.   Precautions   Precautions Spinal / Back Precautions    Vitals   O2 Delivery Device None - Room Air   Pain 0 - 10 Group   Location Neck   Pain Rating Scale (NPRS) 2   Therapist Pain Assessment Post Activity Pain Same as Prior to Activity   Prior Living Situation   Prior Services Home-Independent   Housing / Facility 1 Story House   Steps Into Home 5   Steps In Home 0   Rail Left Rail  (Steps into Home)   Bathroom Set up Bathtub / Shower Combination   Equipment Owned Single Point Cane   Lives with - Patient's Self Care Capacity Sibling  (2 brothers)   Prior Level of Functional Mobility   Bed Mobility Independent   Transfer Status Independent   Ambulation Independent   Ambulation Distance community   Assistive Devices Used None   Stairs Independent   History of Falls   History of Falls No   Cognition    Cognition / Consciousness WDL   Level of Consciousness Alert   Strength Upper Body   Upper Body Strength  WDL   Strength Lower Body   Lower Body Strength  WDL   Coordination Upper Body   Coordination WDL   Coordination Lower Body    Coordination Lower Body  WDL   Balance Assessment   Sitting Balance (Static) Good   Sitting Balance (Dynamic) Fair +   Standing Balance (Static) Fair   Standing Balance (Dynamic) Fair   Weight Shift Sitting Good   Weight Shift Standing Good   Bed Mobility    Supine to Sit Standby Assist   Gait Analysis   Gait Level Of Assist Standby Assist   Distance (Feet) 510   # of Times Distance was Traveled 1   Deviation No deviation   # of Stairs Climbed 5   Level of Assist with Stairs Standby Assist   Functional Mobility   Sit to Stand Standby Assist   Bed, Chair, Wheelchair Transfer Standby Assist   Mobility ambulation in hallway w/o device; up/down stairs   How much difficulty does the patient currently have...   Turning over in bed (including adjusting bedclothes, sheets and blankets)? 4   Sitting down on and standing up from a chair with arms (e.g., wheelchair, bedside commode, etc.) 4    Moving from lying on back to sitting on the side of the bed? 4   How much help from another person does the patient currently need...   Moving to and from a bed to a chair (including a wheelchair)? 3   Need to walk in a hospital room? 3   Climbing 3-5 steps with a railing? 3   6 clicks Mobility Score 21   Activity Tolerance   Sitting in Chair 5 min   Sitting Edge of Bed 2 min   Standing 10min   Education Group   Education Provided Role of Physical Therapist;Gait Training;Transfer Status;Cervical Precautions   Cervical Precautions Patient Response Patient;Acceptance;Explanation;Verbal Demonstration;Handout   Role of Physical Therapist Patient Response Patient;Acceptance;Explanation;Verbal Demonstration   Gait Training Patient Response Patient;Acceptance;Explanation;Verbal Demonstration;Action Demonstration   Transfer Status Patient Response Patient;Acceptance;Explanation;Verbal Demonstration;Action Demonstration   Physical Therapy Initial Treatment Plan    Duration Evaluation only   Anticipated Discharge Equipment and Recommendations   DC Equipment Recommendations None   Discharge Recommendations Anticipate that the patient will have no further physical therapy needs after discharge from the hospital   Interdisciplinary Plan of Care Collaboration   IDT Collaboration with  Nursing;Occupational Therapist   Patient Position at End of Therapy Seated;Call Light within Reach;Tray Table within Reach;Phone within Reach;Chair Alarm On   Collaboration Comments RN updated   Session Information   Date / Session Number  4/13 eval only     Erin Frederick DPT

## 2023-08-15 ENCOUNTER — TELEPHONE (OUTPATIENT)
Dept: PHYSICAL THERAPY | Facility: REHABILITATION | Age: 58
End: 2023-08-15
Payer: OTHER MISCELLANEOUS

## 2023-08-15 NOTE — OP THERAPY DISCHARGE SUMMARY
Outpatient Physical Therapy  DISCHARGE SUMMARY NOTE      [address]  [address]  [place] [address]  Phone:  [phone]  Fax:  [phone]    Date of Visit: 08/15/2023    Patient: Misael Echevarria  YOB: 1965  MRN: [id number]     Referring Provider: Yannick Ovalles M.D.   Referring Diagnosis right shoulder [M75.81]         Functional Assessment Used        Your patient is being discharged from Physical Therapy with the following comments:   Goals partially met  Progress plateau  No patient contact since   4/ 23 /21  .  Patient is independent with his HEP and is being discharged from therapy at this time.     Garrison Baig, PT, DPT, OCS    Date: 8/15/2023

## 2024-08-27 NOTE — OP THERAPY DAILY TREATMENT
"  Outpatient Physical Therapy  DAILY TREATMENT     [facility] [address]  [address]  [place] [address]  Phone:  [phone]  Fax:  [phone]    Date: 03/18/2021    Patient: Misael Echevarria  YOB: 1965  MRN: [id number]     Time Calculation    Start time: 0845  Stop time: 0940 Time Calculation (min): 55 minutes         Chief Complaint: No chief complaint on file.    Visit #: 5/6--confirmed with     SUBJECTIVE:  Good temporary releif with the tape but patient reprots that his shoulder is worse and now his neck feels tight and he has a h/a  OBJECTIVE:  R ghj AROM  Fle: 20 erp  abd 10 erp   er 10 erp          Therapeutic Treatments and Modalities:     Therapeutic Treatment and Modalities Summary:   MFR to R scap  C/s raking and manual traction//\" so much better, no more h/a\"  Caudal mobs at end range fle/abd er gd 1//  [place] to infra//deltoid, levator and pec, upper traps and rhonboids  5/5 mhp x15' mhp   tape for ghj instability    Time-based treatments/modalities:    Physical Therapy Timed Treatment Charges  Manual therapy minutes (CPT 06200): 23 minutes      Pain rating (1-10) before treatment:  9/10 at rest  Pain rating (1-10) after treatment: 7/10 \"     \" better now  With tape and still no h/a or neck pain\"  ASSESSMENT:   Patient c/o increased shoulder and neck pain over the past week with a h/a for the past 24 hrs.  Cont. sevre pain limiting all treatment.  PLAN/RECOMMENDATIONS:   Cont. tp recommend patient see an orthopedic specialist ASAP.( patient states that he sees th3e orthopedic specialist on this Monday) Cont.  Mobs and PROM for pain management and to maintain joint motion       "
Name band;

## 2024-09-28 DIAGNOSIS — I65.23 ATHEROSCLEROSIS OF BOTH CAROTID ARTERIES: ICD-10-CM

## 2024-09-28 DIAGNOSIS — E78.5 HYPERLIPIDEMIA, UNSPECIFIED HYPERLIPIDEMIA TYPE: ICD-10-CM

## 2024-09-30 RX ORDER — ATORVASTATIN CALCIUM 40 MG/1
40 TABLET, FILM COATED ORAL NIGHTLY
Qty: 30 TABLET | Refills: 0 | Status: SHIPPED | OUTPATIENT
Start: 2024-09-30

## 2024-11-30 DIAGNOSIS — I65.23 ATHEROSCLEROSIS OF BOTH CAROTID ARTERIES: ICD-10-CM

## 2024-11-30 DIAGNOSIS — E78.5 HYPERLIPIDEMIA, UNSPECIFIED HYPERLIPIDEMIA TYPE: ICD-10-CM

## 2024-12-02 RX ORDER — ATORVASTATIN CALCIUM 40 MG/1
40 TABLET, FILM COATED ORAL NIGHTLY
Qty: 30 TABLET | Refills: 0 | Status: SHIPPED | OUTPATIENT
Start: 2024-12-02

## 2025-01-27 ENCOUNTER — OFFICE VISIT (OUTPATIENT)
Dept: URGENT CARE | Facility: PHYSICIAN GROUP | Age: 60
End: 2025-01-27

## 2025-01-27 VITALS
TEMPERATURE: 99.2 F | OXYGEN SATURATION: 93 % | HEART RATE: 82 BPM | SYSTOLIC BLOOD PRESSURE: 152 MMHG | WEIGHT: 196 LBS | RESPIRATION RATE: 16 BRPM | DIASTOLIC BLOOD PRESSURE: 72 MMHG | HEIGHT: 71 IN | BODY MASS INDEX: 27.44 KG/M2

## 2025-01-27 DIAGNOSIS — R03.0 ELEVATED BLOOD PRESSURE READING: ICD-10-CM

## 2025-01-27 DIAGNOSIS — J06.9 URI, ACUTE: ICD-10-CM

## 2025-01-27 PROCEDURE — 3077F SYST BP >= 140 MM HG: CPT

## 2025-01-27 PROCEDURE — 99214 OFFICE O/P EST MOD 30 MIN: CPT

## 2025-01-27 PROCEDURE — 3078F DIAST BP <80 MM HG: CPT

## 2025-01-27 RX ORDER — METHYLPREDNISOLONE 4 MG/1
TABLET ORAL
Qty: 21 TABLET | Refills: 0 | Status: CANCELLED | OUTPATIENT
Start: 2025-01-27

## 2025-01-27 RX ORDER — METHYLPREDNISOLONE 4 MG/1
TABLET ORAL
Qty: 21 TABLET | Refills: 0 | Status: SHIPPED | OUTPATIENT
Start: 2025-01-27

## 2025-01-27 RX ORDER — AZITHROMYCIN 250 MG/1
250 TABLET, FILM COATED ORAL DAILY
Qty: 5 TABLET | Refills: 0 | Status: SHIPPED | OUTPATIENT
Start: 2025-01-27 | End: 2025-02-01

## 2025-01-27 ASSESSMENT — ENCOUNTER SYMPTOMS
BLURRED VISION: 0
WEAKNESS: 0
FOCAL WEAKNESS: 0
COUGH: 1
LOSS OF CONSCIOUSNESS: 0
NAUSEA: 0
MYALGIAS: 0
DIZZINESS: 0
HEMOPTYSIS: 0
VOMITING: 0
ABDOMINAL PAIN: 0
PALPITATIONS: 0
SHORTNESS OF BREATH: 0
FEVER: 0
CHILLS: 0
FALLS: 0
DIARRHEA: 0

## 2025-01-27 ASSESSMENT — FIBROSIS 4 INDEX: FIB4 SCORE: 1.19

## 2025-01-27 NOTE — PROGRESS NOTES
Subjective:     Misael Echevarria is a 59 y.o. male who presents for Cold Symptoms (Cough , 9 days )      Cough  This is a new problem. The current episode started 1 to 4 weeks ago. The problem has been waxing and waning. The cough is Productive of brown sputum and productive of purulent sputum. Associated symptoms include nasal congestion. Pertinent negatives include no chest pain, chills, ear congestion, fever, hemoptysis, myalgias, rash or shortness of breath. He has tried rest for the symptoms. The treatment provided no relief. His past medical history is significant for bronchitis. poss COPD       Review of Systems   Constitutional:  Negative for chills, fever and malaise/fatigue.   HENT:  Negative for ear discharge.    Eyes:  Negative for blurred vision.   Respiratory:  Positive for cough. Negative for hemoptysis and shortness of breath.    Cardiovascular:  Negative for chest pain, palpitations and leg swelling.   Gastrointestinal:  Negative for abdominal pain, diarrhea, nausea and vomiting.   Musculoskeletal:  Negative for falls and myalgias.   Skin:  Negative for itching and rash.   Neurological:  Negative for dizziness, focal weakness, loss of consciousness and weakness.        CURRENT MEDICATIONS:  aspirin EC Tbec  atorvastatin Tabs  gabapentin Caps  nicotine Pt24    Allergies:   No Known Allergies    Current Problems: Misael Echevarria does not have any pertinent problems on file.  Past Surgical Hx:    Past Surgical History:   Procedure Laterality Date    CERVICAL DISK AND FUSION ANTERIOR  4/12/2023    Procedure: ANTERIOR CERVICAL DECOMPRESSION AND FUSION C-4-5, C5-6 AND C6-7;  Surgeon: Bob Lynn M.D.;  Location: SURGERY [facility];  Service: Neurosurgery    BICEPS TENDON REPAIR  2022    APPENDECTOMY      HERNIA REPAIR      bilateral inguinal     OTHER ORTHOPEDIC SURGERY      RIght meniscus      Past Social Hx:  reports that he has been smoking cigarettes. He has a 15 pack-year smoking history. He  "has never used smokeless tobacco. He reports current alcohol use. He reports current drug use. Drug: Marijuana.   Past Family Hx:  Misael Echevarria family history includes Cancer in his brother and mother.     (Allergies, Medications, & Tobacco/Substance Use were reconciled by the Medical Assistant and reviewed by myself. The family history is prepopulated)       Objective:     BP (!) 152/72 (BP Location: Left arm, Patient Position: Sitting, BP Cuff Size: Adult)   Pulse 82   Temp 37.3 °C (99.2 °F) (Temporal)   Resp 16   Ht 1.803 m (5' 11\")   Wt 88.9 kg (196 lb)   SpO2 93%   BMI 27.34 kg/m²     Physical Exam  Constitutional:       General: He is not in acute distress.     Appearance: Normal appearance. He is ill-appearing. He is not toxic-appearing.   HENT:      Head: Normocephalic and atraumatic.      Right Ear: There is no impacted cerumen.      Left Ear: There is no impacted cerumen.      Nose: Congestion present. No rhinorrhea.   Eyes:      General: No scleral icterus.        Right eye: No discharge.         Left eye: No discharge.   Cardiovascular:      Rate and Rhythm: Normal rate and regular rhythm.   Pulmonary:      Effort: Pulmonary effort is normal. No respiratory distress.      Breath sounds: Rales present.   Abdominal:      General: Abdomen is flat.      Palpations: Abdomen is soft.   Musculoskeletal:         General: Normal range of motion.      Cervical back: Normal range of motion.   Neurological:      General: No focal deficit present.      Mental Status: He is alert and oriented to person, place, and time. Mental status is at baseline.   Psychiatric:         Behavior: Behavior normal.         Judgment: Judgment normal.         Assessment/Plan:   Misael was seen today for cold symptoms.    Diagnoses and all orders for this visit:    Elevated blood pressure reading  -     methylPREDNISolone (MEDROL DOSEPAK) 4 MG Tablet Therapy Pack; Follow schedule on package instructions.    URI, acute  -     " azithromycin (ZITHROMAX) 250 MG Tab; Take 1 Tablet by mouth every day for 5 days.  -     methylPREDNISolone (MEDROL DOSEPAK) 4 MG Tablet Therapy Pack; Follow schedule on package instructions.     Based on history of presenting illness, review of systems and physical exam findings, most likely etiology of prolonged course of cough congestion, with 30+ smoking year history, it has possible COPD exacerbation, cannot find any previous diagnosis of CT scan, patient has been cigarette free for 2 years now.  He does have some congestion at baseline.  Out of an abundance of caution and history of bronchitis, will treat as possible COPD exacerbation with viral precautions at this time.  discussed symptomatic management with pharmacotherapy and over-the-counter medications.  Discussed the risks the benefits and the indications of all new medications prescribed today.  Strict return and ED precautions were discussed and all questions were answered.     Differential diagnosis, natural history, supportive care, and indications for immediate follow-up discussed.    Advised the patient to follow-up with the primary care physician for recheck, reevaluation, and consideration of further management.    Please note that this dictation was created using voice recognition software. I have made reasonable attempt to correct obvious errors, but I expect that there are errors of grammar and possibly content that I did not discover before finalizing the note.    This note was electronically signed by Margarette Calle MD PhD

## 2025-01-31 ENCOUNTER — PATIENT MESSAGE (OUTPATIENT)
Dept: MEDICAL GROUP | Facility: LAB | Age: 60
End: 2025-01-31

## 2025-01-31 DIAGNOSIS — E78.5 HYPERLIPIDEMIA, UNSPECIFIED HYPERLIPIDEMIA TYPE: ICD-10-CM

## 2025-02-03 ENCOUNTER — APPOINTMENT (OUTPATIENT)
Dept: LAB | Facility: MEDICAL CENTER | Age: 60
End: 2025-02-03

## 2025-02-04 ENCOUNTER — HOSPITAL ENCOUNTER (OUTPATIENT)
Dept: LAB | Facility: MEDICAL CENTER | Age: 60
End: 2025-02-04
Attending: STUDENT IN AN ORGANIZED HEALTH CARE EDUCATION/TRAINING PROGRAM

## 2025-02-04 DIAGNOSIS — E78.5 HYPERLIPIDEMIA, UNSPECIFIED HYPERLIPIDEMIA TYPE: ICD-10-CM

## 2025-02-04 LAB
ALBUMIN SERPL BCP-MCNC: 4.1 G/DL (ref 3.2–4.9)
ALBUMIN/GLOB SERPL: 1.5 G/DL
ALP SERPL-CCNC: 81 U/L (ref 30–99)
ALT SERPL-CCNC: 26 U/L (ref 2–50)
ANION GAP SERPL CALC-SCNC: 8 MMOL/L (ref 7–16)
AST SERPL-CCNC: 27 U/L (ref 12–45)
BILIRUB SERPL-MCNC: 0.6 MG/DL (ref 0.1–1.5)
BUN SERPL-MCNC: 11 MG/DL (ref 8–22)
CALCIUM ALBUM COR SERPL-MCNC: 9.1 MG/DL (ref 8.5–10.5)
CALCIUM SERPL-MCNC: 9.2 MG/DL (ref 8.5–10.5)
CHLORIDE SERPL-SCNC: 104 MMOL/L (ref 96–112)
CHOLEST SERPL-MCNC: 161 MG/DL (ref 100–199)
CO2 SERPL-SCNC: 26 MMOL/L (ref 20–33)
CREAT SERPL-MCNC: 0.87 MG/DL (ref 0.5–1.4)
GFR SERPLBLD CREATININE-BSD FMLA CKD-EPI: 99 ML/MIN/1.73 M 2
GLOBULIN SER CALC-MCNC: 2.8 G/DL (ref 1.9–3.5)
GLUCOSE SERPL-MCNC: 100 MG/DL (ref 65–99)
HDLC SERPL-MCNC: 54 MG/DL
LDLC SERPL CALC-MCNC: 88 MG/DL
POTASSIUM SERPL-SCNC: 4.3 MMOL/L (ref 3.6–5.5)
PROT SERPL-MCNC: 6.9 G/DL (ref 6–8.2)
SODIUM SERPL-SCNC: 138 MMOL/L (ref 135–145)
TRIGL SERPL-MCNC: 95 MG/DL (ref 0–149)

## 2025-02-04 PROCEDURE — 80061 LIPID PANEL: CPT

## 2025-02-04 PROCEDURE — 80053 COMPREHEN METABOLIC PANEL: CPT

## 2025-02-04 PROCEDURE — 36415 COLL VENOUS BLD VENIPUNCTURE: CPT

## 2025-04-11 DIAGNOSIS — E78.5 HYPERLIPIDEMIA, UNSPECIFIED HYPERLIPIDEMIA TYPE: ICD-10-CM

## 2025-04-11 DIAGNOSIS — I65.23 ATHEROSCLEROSIS OF BOTH CAROTID ARTERIES: ICD-10-CM

## 2025-04-14 RX ORDER — ATORVASTATIN CALCIUM 40 MG/1
40 TABLET, FILM COATED ORAL NIGHTLY
Qty: 90 TABLET | Refills: 0 | Status: SHIPPED | OUTPATIENT
Start: 2025-04-14

## (undated) DEVICE — SUTURE GENERAL

## (undated) DEVICE — SUCTION INSTRUMENT YANKAUER BULBOUS TIP W/O VENT (50EA/CA)

## (undated) DEVICE — GLOVE BIOGEL INDICATOR SZ 8 SURGICAL PF LTX - (50/BX 4BX/CA)

## (undated) DEVICE — GLOVE BIOGEL SZ 7.5 SURGICAL PF LTX - (50PR/BX 4BX/CA)

## (undated) DEVICE — TUBING CLEARLINK DUO-VENT - C-FLO (48EA/CA)

## (undated) DEVICE — ADHESIVE MASTISOL - (48/BX)

## (undated) DEVICE — GLOVE BIOGEL SZ 8.5 SURGICAL PF LTX - (50PR/BX 4BX/CA)

## (undated) DEVICE — LACTATED RINGERS INJ. 500 ML - (24EA/CA)

## (undated) DEVICE — GLOVE BIOGEL SZ 7 SURGICAL PF LTX - (50PR/BX 4BX/CA)

## (undated) DEVICE — GLOVE BIOGEL INDICATOR SZ 7.5 SURGICAL PF LTX - (50PR/BX 4BX/CA)

## (undated) DEVICE — DRAPE MICROSCOPE X-LONG (10EA/CA)

## (undated) DEVICE — SOLUTION PREP PVP IODINE 3/4 OZ POUCH PACKET CONTAINER STERILE LATEX FREE

## (undated) DEVICE — TOWELS CLOTH SURGICAL - (4/PK 20PK/CA)

## (undated) DEVICE — SET EXTENSION WITH 2 PORTS (48EA/CA) ***PART #2C8610 IS A SUBSTITUTE*****

## (undated) DEVICE — COVER LIGHT HANDLE ALC PLUS DISP (18EA/BX)

## (undated) DEVICE — TOWEL STOP TIMEOUT SAFETY FLAG (40EA/CA)

## (undated) DEVICE — CORDS BIPOLAR COAGULATION - 12FT STERILE DISP. (10EA/BX)

## (undated) DEVICE — SUTURE 3-0 ETHILON FS-1 - (36/BX) 30 INCH

## (undated) DEVICE — DRAPE 36X28IN RAD CARM BND BG - (25/CA) O

## (undated) DEVICE — KIT EVACUATER 3 SPRING PVC LF 1/8 DRAIN SIZE (10EA/CA)"

## (undated) DEVICE — SUTURE 2-0 VICRYL PLUS CT-2 - 27 INCH (36/BX)

## (undated) DEVICE — SPONGE PEANUT - (5/PK 50PK/CA)

## (undated) DEVICE — COVER MAYO STAND X-LG - (22EA/CA)

## (undated) DEVICE — SPONGE GAUZESTER 4 X 4 4PLY - (128PK/CA)

## (undated) DEVICE — BAG SPONGE COUNT 10.25 X 32 - BLUE (250/CA)

## (undated) DEVICE — PACK UNIVERSAL SURGICAL ECLIPSE 1 (5EA/CA)

## (undated) DEVICE — GLOVE BIOGEL INDICATOR SZ 8.5 SURGICAL PF LTX - (50/BX 4BX/CA)

## (undated) DEVICE — PACK NEURO - (2EA/CA)

## (undated) DEVICE — SCREW DISTRACTION 14MM YELLOW - STERILE (10EA/BX) (5TX4=20)

## (undated) DEVICE — NEEDLE SPINAL NON-SAFETY 18 GA X 3 IN (25EA/BX)

## (undated) DEVICE — BOVIE BLADE COATED &INSULATED - 25/PK

## (undated) DEVICE — MIDAS LUBRICATOR DIFFUSER PACK (4EA/CA)

## (undated) DEVICE — TOOL DISSECTING BIT MR8 OD3MM L14CM (1EA)

## (undated) DEVICE — CANISTER SUCTION 3000ML MECHANICAL FILTER AUTO SHUTOFF MEDI-VAC NONSTERILE LF DISP  (40EA/CA)

## (undated) DEVICE — INTRAOP NEURO IN OR 1:1 PER 15 MIN

## (undated) DEVICE — SHEET PEDIATRIC LAPAROTOMY - (10/CA)

## (undated) DEVICE — TRAY SKIN SCRUB PVP WET (20EA/CA) PART #DYND70356 DISCONTINUED

## (undated) DEVICE — KIT SKIN PREP SURG. SOLUTION - DURAPREP (20 KT/CA)

## (undated) DEVICE — PIN FIXATION

## (undated) DEVICE — DRILL BIT 14MM

## (undated) DEVICE — CLOSURE SKIN STRIP 1/2 X 4 IN - (STERI STRIP) (50/BX 4BX/CA)

## (undated) DEVICE — LACTATED RINGERS INJ 1000 ML - (14EA/CA 60CA/PF)

## (undated) DEVICE — SENSOR OXIMETER ADULT SPO2 RD SET (20EA/BX)

## (undated) DEVICE — SUTURE 3-0 MONOCRYL PLUS PS-1 - 27 INCH (36/BX)

## (undated) DEVICE — DRAPE STRLE REG TOWEL 18X24 - (10/BX 4BX/CA)"

## (undated) DEVICE — SURGIFOAM (SIZE 100) - (6EA/CA)

## (undated) DEVICE — SUTURE 3-0 VICRYL PLUS SH - 27 INCH (36/BX)